# Patient Record
Sex: MALE | Race: WHITE | NOT HISPANIC OR LATINO | Employment: PART TIME | URBAN - METROPOLITAN AREA
[De-identification: names, ages, dates, MRNs, and addresses within clinical notes are randomized per-mention and may not be internally consistent; named-entity substitution may affect disease eponyms.]

---

## 2020-02-27 ENCOUNTER — OFFICE VISIT (OUTPATIENT)
Dept: FAMILY MEDICINE CLINIC | Facility: CLINIC | Age: 55
End: 2020-02-27

## 2020-02-27 VITALS
RESPIRATION RATE: 16 BRPM | OXYGEN SATURATION: 99 % | BODY MASS INDEX: 26.07 KG/M2 | WEIGHT: 172 LBS | HEART RATE: 67 BPM | DIASTOLIC BLOOD PRESSURE: 90 MMHG | HEIGHT: 68 IN | TEMPERATURE: 97.6 F | SYSTOLIC BLOOD PRESSURE: 160 MMHG

## 2020-02-27 DIAGNOSIS — F41.0 PANIC DISORDER: ICD-10-CM

## 2020-02-27 DIAGNOSIS — Z78.9 NEED FOR FOLLOW-UP BY SOCIAL WORKER: Primary | ICD-10-CM

## 2020-02-27 DIAGNOSIS — R03.0 ELEVATED BLOOD PRESSURE READING: ICD-10-CM

## 2020-02-27 PROCEDURE — 3008F BODY MASS INDEX DOCD: CPT | Performed by: PHYSICIAN ASSISTANT

## 2020-02-27 PROCEDURE — 1036F TOBACCO NON-USER: CPT | Performed by: NURSE PRACTITIONER

## 2020-02-27 PROCEDURE — 99204 OFFICE O/P NEW MOD 45 MIN: CPT | Performed by: NURSE PRACTITIONER

## 2020-02-27 PROCEDURE — 3008F BODY MASS INDEX DOCD: CPT | Performed by: NURSE PRACTITIONER

## 2020-02-27 RX ORDER — CLONAZEPAM 0.5 MG/1
0.5 TABLET ORAL 2 TIMES DAILY
Qty: 60 TABLET | Refills: 0 | Status: SHIPPED | OUTPATIENT
Start: 2020-02-27 | End: 2020-03-28 | Stop reason: SDUPTHER

## 2020-02-27 NOTE — PROGRESS NOTES
Assessment/Plan:    Elevated blood pressure reading   Blood pressure elevated in the office today: the patient denies a history of hypertension but does admit that he is very anxious about being here today   discussed with the patient that he will return to clinic on Monday for blood pressure recheck with nurse if blood pressure is elevated again at that time he agrees to start antihypertensive medication   encouraged the patient to decrease dietary sodium intake, avoid tobacco, avoid alcohol or illicit drugs, avoid excessive caffeine intake, avoid NSAIDs, participate in exercise    Need for follow-up by    The patient requires assistance with becoming established with a mental health provider      Panic disorder   Will continue patient's previously ordered medication clonazepam b i d    recommended that the patient start fluoxetine daily to control symptoms-- the patient declines and would rather wait to see a psychiatrist before changing medication   denies suicidal ideation or self injury   encourage the patient to participate in routine exercise especially swimming as he states that this is something he has utilized previously to help control anxiety and panic   referral to behavioral health specialist         Return for RTC on Friday for BP recheck and in 1 month provider   Patient Instructions   Anxiety   WHAT YOU NEED TO KNOW:   Anxiety is a condition that causes you to feel extremely worried or nervous  The feelings are so strong that they can cause problems with your daily activities or sleep  Anxiety may be triggered by something you fear, or it may happen without a cause  Family or work stress, smoking, caffeine, and alcohol can increase your risk for anxiety  Certain medicines or health conditions can also increase your risk  Anxiety can become a long-term condition if it is not managed or treated     DISCHARGE INSTRUCTIONS:   Call 911 if:   · You have chest pain, tightness, or heaviness that may spread to your shoulders, arms, jaw, neck, or back  · You feel like hurting yourself or someone else  Contact your healthcare provider if:   · Your symptoms get worse or do not get better with treatment  · Your anxiety keeps you from doing your regular daily activities  · You have new symptoms since your last visit  · You have questions or concerns about your condition or care  Medicines:   · Medicines  may be given to help you feel more calm and relaxed, and decrease your symptoms  · Take your medicine as directed  Contact your healthcare provider if you think your medicine is not helping or if you have side effects  Tell him of her if you are allergic to any medicine  Keep a list of the medicines, vitamins, and herbs you take  Include the amounts, and when and why you take them  Bring the list or the pill bottles to follow-up visits  Carry your medicine list with you in case of an emergency  Follow up with your healthcare provider within 2 weeks or as directed:  Write down your questions so you remember to ask them during your visits  Manage anxiety:   · Talk to someone about your anxiety  Your healthcare provider may suggest counseling  Cognitive behavioral therapy can help you understand and change how you react to events that trigger your symptoms  You might feel more comfortable talking with a friend or family member about your anxiety  Choose someone you know will be supportive and encouraging  · Find ways to relax  Activities such as exercise, meditation, or listening to music can help you relax  Spend time with friends, or do things you enjoy  · Practice deep breathing  Deep breathing can help you relax when you feel anxious  Focus on taking slow, deep breaths several times a day, or during an anxiety attack  Breathe in through your nose and out through your mouth  · Create a regular sleep routine  Regular sleep can help you feel calmer during the day   Go to sleep and wake up at the same times every day  Do not watch television or use the computer right before bed  Your room should be comfortable, dark, and quiet  · Eat a variety of healthy foods  Healthy foods include fruits, vegetables, low-fat dairy products, lean meats, fish, whole-grain breads, and cooked beans  Healthy foods can help you feel less anxious and have more energy  · Exercise regularly  Exercise can increase your energy level  Exercise may also lift your mood and help you sleep better  Your healthcare provider can help you create an exercise plan  · Do not smoke  Nicotine and other chemicals in cigarettes and cigars can increase anxiety  Ask your healthcare provider for information if you currently smoke and need help to quit  E-cigarettes or smokeless tobacco still contain nicotine  Talk to your healthcare provider before you use these products  · Do not have caffeine  Caffeine can make your symptoms worse  Do not have foods or drinks that are meant to increase your energy level  · Limit or do not drink alcohol  Ask your healthcare provider if alcohol is safe for you  You may not be able to drink alcohol if you take certain anxiety or depression medicines  Limit alcohol to 1 drink per day if you are a woman  Limit alcohol to 2 drinks per day if you are a man  A drink of alcohol is 12 ounces of beer, 5 ounces of wine, or 1½ ounces of liquor  · Do not use drugs  Drugs can make your anxiety worse  It can also make anxiety hard to manage  Talk to your healthcare provider if you use drugs and want help to quit  © 2017 [address] [employer] Information is for End User's use only and may not be sold, redistributed or otherwise used for commercial purposes  All illustrations and images included in CareNotes® are the copyrighted property of [employer]  or Tian Spicer  The above information is an  only   It is not intended as medical advice for individual conditions or treatments  Talk to your doctor, nurse or pharmacist before following any medical regimen to see if it is safe and effective for you  Diagnoses and all orders for this visit:    Need for follow-up by   -     Ambulatory referral to social work care management program    Panic disorder  -     CBC and differential; Future  -     Comprehensive metabolic panel; Future  -     Hemoglobin A1C; Future  -     Lipid panel; Future  -     TSH, 3rd generation with Free T4 reflex; Future  -     Vitamin D 25 hydroxy; Future  -     Ambulatory referral to behavioral health therapists; Future  -     clonazePAM (KlonoPIN) 0 5 mg tablet; Take 1 tablet (0 5 mg total) by mouth 2 (two) times a day    Elevated blood pressure reading          Subjective:     Bereket Murrell is a 53 y o  male who  has a past medical history of Anxiety  who presented to the office today to establish care  HPI  Patient presents today to establish care  He has lived in the area for about 9 months but has not established with any providers  He was previously living in [facility]  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 [employer]  He was previously working at [employer] but had to quit his job secondary to panic attack  He was previously established with a psychiatrist and therapist in [place] where he was receiving counseling and the was taking clonazepam 0 5 mg b i d  He reports that he has tried to not take the medication and develops panic attacks and has ideas of paranoia  He was previously taking Seroquel which made him tired all the time and so he stopped  He has never been on any other medication  Besides Seroquel and clonazepam   The patient reports that he utilizes swimming as a formal exercise and methods to help control his anxiety  Since moving to the [facility] he has not been able to participate in this activity  Due to finances   Discussed with the patient that the [facility] has a large living full and often has financial aid for those who cannot afford the membership  He denies any suicide ideation or self injury  His blood pressure is noted to be elevated in the office today  He does endorse that he is very anxious about being here today  He does not recall ever having been diagnosed with hypertension before  He has never taken blood pressure medication  He denies shortness of breath, chest pain, headaches, palpitations, dizziness, visual changes, syncope  He reports that his family history is significant for hypertension and diabetes in his mother and father  He denies any use of tobacco, alcohol, illicit drugs  The following portions of the patient's history were reviewed and updated as appropriate: allergies, current medications, past family history, past medical history, past social history, past surgical history and problem list     No current outpatient medications on file prior to visit  No current facility-administered medications on file prior to visit  Review of Systems   Constitutional: Negative for activity change, appetite change, chills, fatigue, fever and unexpected weight change  HENT: Negative for hearing loss, nosebleeds, sinus pain, sneezing, sore throat and trouble swallowing  Eyes: Negative for photophobia and visual disturbance  Respiratory: Negative for cough, chest tightness, shortness of breath and wheezing  Cardiovascular: Negative for chest pain, palpitations and leg swelling  Gastrointestinal: Negative for abdominal pain, constipation, nausea and vomiting  Genitourinary: Negative for decreased urine volume, difficulty urinating, dysuria, flank pain and urgency  Musculoskeletal: Negative for back pain and gait problem  Skin: Negative for pallor, rash and wound  Neurological: Negative for dizziness, seizures, syncope, weakness, numbness and headaches     Hematological: Negative for adenopathy  Does not bruise/bleed easily  Psychiatric/Behavioral: Positive for dysphoric mood  Negative for confusion, hallucinations, self-injury, sleep disturbance and suicidal ideas  The patient is nervous/anxious  Objective:    /90 (BP Location: Left arm, Patient Position: Sitting, Cuff Size: Standard)   Pulse 67   Temp 97 6 °F (36 4 °C) (Temporal)   Resp 16   Ht 5' 8" (1 727 m)   Wt 78 kg (172 lb)   SpO2 99%   BMI 26 15 kg/m²     Physical Exam   Constitutional: He is oriented to person, place, and time  He appears well-developed and well-nourished  No distress  HENT:   Head: Normocephalic and atraumatic  Right Ear: External ear normal    Left Ear: External ear normal    Nose: Nose normal    Mouth/Throat: Oropharynx is clear and moist  No oropharyngeal exudate  Eyes: Pupils are equal, round, and reactive to light  EOM are normal    Neck: Normal range of motion  Neck supple  Cardiovascular: Normal rate, regular rhythm, normal heart sounds and intact distal pulses  Pulmonary/Chest: Effort normal and breath sounds normal  No respiratory distress  He has no wheezes  Abdominal: Soft  Bowel sounds are normal  He exhibits no distension  There is no tenderness  Musculoskeletal: Normal range of motion  He exhibits no edema or deformity  Lymphadenopathy:     He has no cervical adenopathy  Neurological: He is alert and oriented to person, place, and time  He displays normal reflexes  No sensory deficit  Coordination normal    Skin: Skin is warm and dry  Capillary refill takes less than 2 seconds  No rash noted  He is not diaphoretic  Psychiatric: His behavior is normal  His mood appears anxious  Nursing note and vitals reviewed        ALYSSA Downs  02/27/20  12:54 PM

## 2020-02-27 NOTE — ASSESSMENT & PLAN NOTE
Will continue patient's previously ordered medication clonazepam b i d    recommended that the patient start fluoxetine daily to control symptoms-- the patient declines and would rather wait to see a psychiatrist before changing medication   denies suicidal ideation or self injury   encourage the patient to participate in routine exercise especially swimming as he states that this is something he has utilized previously to help control anxiety and panic   referral to behavioral health specialist

## 2020-02-27 NOTE — ASSESSMENT & PLAN NOTE
Blood pressure elevated in the office today: the patient denies a history of hypertension but does admit that he is very anxious about being here today   discussed with the patient that he will return to clinic on Monday for blood pressure recheck with nurse if blood pressure is elevated again at that time he agrees to start antihypertensive medication   encouraged the patient to decrease dietary sodium intake, avoid tobacco, avoid alcohol or illicit drugs, avoid excessive caffeine intake, avoid NSAIDs, participate in exercise

## 2020-02-27 NOTE — PATIENT INSTRUCTIONS
Anxiety   WHAT YOU NEED TO KNOW:   Anxiety is a condition that causes you to feel extremely worried or nervous  The feelings are so strong that they can cause problems with your daily activities or sleep  Anxiety may be triggered by something you fear, or it may happen without a cause  Family or work stress, smoking, caffeine, and alcohol can increase your risk for anxiety  Certain medicines or health conditions can also increase your risk  Anxiety can become a long-term condition if it is not managed or treated  DISCHARGE INSTRUCTIONS:   Call 911 if:   · You have chest pain, tightness, or heaviness that may spread to your shoulders, arms, jaw, neck, or back  · You feel like hurting yourself or someone else  Contact your healthcare provider if:   · Your symptoms get worse or do not get better with treatment  · Your anxiety keeps you from doing your regular daily activities  · You have new symptoms since your last visit  · You have questions or concerns about your condition or care  Medicines:   · Medicines  may be given to help you feel more calm and relaxed, and decrease your symptoms  · Take your medicine as directed  Contact your healthcare provider if you think your medicine is not helping or if you have side effects  Tell him of her if you are allergic to any medicine  Keep a list of the medicines, vitamins, and herbs you take  Include the amounts, and when and why you take them  Bring the list or the pill bottles to follow-up visits  Carry your medicine list with you in case of an emergency  Follow up with your healthcare provider within 2 weeks or as directed:  Write down your questions so you remember to ask them during your visits  Manage anxiety:   · Talk to someone about your anxiety  Your healthcare provider may suggest counseling  Cognitive behavioral therapy can help you understand and change how you react to events that trigger your symptoms   You might feel more comfortable talking with a friend or family member about your anxiety  Choose someone you know will be supportive and encouraging  · Find ways to relax  Activities such as exercise, meditation, or listening to music can help you relax  Spend time with friends, or do things you enjoy  · Practice deep breathing  Deep breathing can help you relax when you feel anxious  Focus on taking slow, deep breaths several times a day, or during an anxiety attack  Breathe in through your nose and out through your mouth  · Create a regular sleep routine  Regular sleep can help you feel calmer during the day  Go to sleep and wake up at the same times every day  Do not watch television or use the computer right before bed  Your room should be comfortable, dark, and quiet  · Eat a variety of healthy foods  Healthy foods include fruits, vegetables, low-fat dairy products, lean meats, fish, whole-grain breads, and cooked beans  Healthy foods can help you feel less anxious and have more energy  · Exercise regularly  Exercise can increase your energy level  Exercise may also lift your mood and help you sleep better  Your healthcare provider can help you create an exercise plan  · Do not smoke  Nicotine and other chemicals in cigarettes and cigars can increase anxiety  Ask your healthcare provider for information if you currently smoke and need help to quit  E-cigarettes or smokeless tobacco still contain nicotine  Talk to your healthcare provider before you use these products  · Do not have caffeine  Caffeine can make your symptoms worse  Do not have foods or drinks that are meant to increase your energy level  · Limit or do not drink alcohol  Ask your healthcare provider if alcohol is safe for you  You may not be able to drink alcohol if you take certain anxiety or depression medicines  Limit alcohol to 1 drink per day if you are a woman  Limit alcohol to 2 drinks per day if you are a man   A drink of alcohol is 12 ounces of beer, 5 ounces of wine, or 1½ ounces of liquor  · Do not use drugs  Drugs can make your anxiety worse  It can also make anxiety hard to manage  Talk to your healthcare provider if you use drugs and want help to quit  © 2017 [employer] Information is for End User's use only and may not be sold, redistributed or otherwise used for commercial purposes  All illustrations and images included in CareNotes® are the copyrighted property of [employer]  or Tian Spicer  The above information is an  only  It is not intended as medical advice for individual conditions or treatments  Talk to your doctor, nurse or pharmacist before following any medical regimen to see if it is safe and effective for you

## 2020-03-02 ENCOUNTER — CLINICAL SUPPORT (OUTPATIENT)
Dept: FAMILY MEDICINE CLINIC | Facility: CLINIC | Age: 55
End: 2020-03-02

## 2020-03-02 ENCOUNTER — DOCUMENTATION (OUTPATIENT)
Dept: FAMILY MEDICINE CLINIC | Facility: CLINIC | Age: 55
End: 2020-03-02

## 2020-03-02 VITALS — DIASTOLIC BLOOD PRESSURE: 86 MMHG | SYSTOLIC BLOOD PRESSURE: 140 MMHG

## 2020-03-02 DIAGNOSIS — Z01.30 BP CHECK: Primary | ICD-10-CM

## 2020-03-02 NOTE — PROGRESS NOTES
I received a referral for this patient  Mr Sherrie Lopez is also in need of a psychiatrist  I am closing this referral on my end since social work also received the same referral  I defer to social work to locate a psychiatrist for Mr Huang  I remain available for consultation

## 2020-03-04 ENCOUNTER — PATIENT OUTREACH (OUTPATIENT)
Dept: FAMILY MEDICINE CLINIC | Facility: CLINIC | Age: 55
End: 2020-03-04

## 2020-03-04 NOTE — PROGRESS NOTES
ESAU SUTHERLAND referral from [facility] to assist pt with resources for a [place] [address] provider  Pt is currently listed as uninsured  ESAU SUTHERLAND spoke with pt who states that he applied for MA with [employer] and has been approved  Pt states he will choose [facility] or [place] as his provider  Pt is requesting ESAU SUTHERLAND to mail a list of local [place] [address] providers to him  ESAU SUTHERLAND will do this today  ESAU SUTHERLAND educated pt to contact [place] [address] provider when he receives his insurance card and he will have a wait time of about 1-2 weeks for a MH intake  Pt expressed understanding  Pt denies any other psychosocial needs at this time  ESAU SUTHERLAND is closing referral at this time, but remains available for additional support as needed via order

## 2020-03-28 ENCOUNTER — TELEMEDICINE (OUTPATIENT)
Dept: FAMILY MEDICINE CLINIC | Facility: CLINIC | Age: 55
End: 2020-03-28

## 2020-03-28 DIAGNOSIS — F41.0 PANIC DISORDER: ICD-10-CM

## 2020-03-28 PROCEDURE — T1015 CLINIC SERVICE: HCPCS | Performed by: NURSE PRACTITIONER

## 2020-03-28 PROCEDURE — 99213 OFFICE O/P EST LOW 20 MIN: CPT | Performed by: NURSE PRACTITIONER

## 2020-03-28 RX ORDER — CLONAZEPAM 0.5 MG/1
0.5 TABLET ORAL 2 TIMES DAILY
Qty: 60 TABLET | Refills: 0 | Status: SHIPPED | OUTPATIENT
Start: 2020-03-28 | End: 2020-05-08 | Stop reason: SDUPTHER

## 2020-03-28 NOTE — PROGRESS NOTES
Virtual Brief Visit    Problem List Items Addressed This Visit        Other    Panic disorder    Relevant Medications    clonazePAM (KlonoPIN) 0 5 mg tablet                Reason for visit is follow up  Encounter provider Jacqui Chen, [facility]    Provider located at [facility] [address] [address] [phone]      Recent Visits  No visits were found meeting these conditions  Showing recent visits within past 7 days and meeting all other requirements     Today's Visits  Date Type Provider Dept   03/28/20 [address] [place] [address] today's visits and meeting all other requirements     Future Appointments  No visits were found meeting these conditions  Showing future appointments within next 150 days and meeting all other requirements        After connecting through telephone, the patient was identified by name and date of birth  Jesus Collins was informed that this is a telemedicine visit and that the visit is being conducted through telephone  My office door was closed  No one else was in the room  He acknowledged consent and understanding of privacy and security of the video platform  The patient has agreed to participate and understands they can discontinue the visit at any time  Patient is aware this is a billable service  Subjective  Jesus Collins is a 53 y o  male with a past medical history of anxiety disorder  The patient was seen in the office one month ago to establish care  The patient was noted to have panic disorder and was ordered clonazepam bid and was directed to follow up with a mental health provider  He reports today that he has not yet established with a mental health provider  He reports that he is doing well, his mood and anxiety has been stable with the use of the clonazepam  He has not completed the previously ordered blood work as of yet due to coronavirus concerns         Past Medical History:   Diagnosis Date    Anxiety        No past surgical history on file  Current Outpatient Medications   Medication Sig Dispense Refill    clonazePAM (KlonoPIN) 0 5 mg tablet Take 1 tablet (0 5 mg total) by mouth 2 (two) times a day 60 tablet 0     No current facility-administered medications for this visit  No Known Allergies    Review of Systems   Constitutional: Negative for activity change, appetite change, chills, fatigue, fever and unexpected weight change  HENT: Negative for hearing loss, nosebleeds, sinus pain, sneezing, sore throat and trouble swallowing  Eyes: Negative for photophobia and visual disturbance  Respiratory: Negative for cough, chest tightness, shortness of breath and wheezing  Cardiovascular: Negative for chest pain, palpitations and leg swelling  Gastrointestinal: Negative for abdominal pain, constipation, nausea and vomiting  Genitourinary: Negative for decreased urine volume, difficulty urinating, dysuria, flank pain, genital sores, hematuria and urgency  Musculoskeletal: Negative for back pain and gait problem  Skin: Negative for pallor, rash and wound  Neurological: Negative for dizziness, seizures, syncope, weakness, numbness and headaches  Hematological: Negative for adenopathy  Does not bruise/bleed easily  Psychiatric/Behavioral: Negative for confusion, hallucinations, self-injury, sleep disturbance and suicidal ideas  The patient is not nervous/anxious  James Bowers was seen today for virtual brief visit  Diagnoses and all orders for this visit:    Panic disorder  -     clonazePAM (KlonoPIN) 0 5 mg tablet; Take 1 tablet (0 5 mg total) by mouth 2 (two) times a day    Discussed with the patient that we will continue the clonazepam at this time  Medication refill was sent to the preferred pharmacy  Encouraged the patient to complete labs and establish with mental health provider as soon as able   Discussed with the patient if he has any other questions or concerns to call the office  He understands and agrees with the plan  I spent 15 minutes with the patient during this visit

## 2020-05-08 DIAGNOSIS — F41.0 PANIC DISORDER: ICD-10-CM

## 2020-05-08 DIAGNOSIS — F41.0 PANIC DISORDER: Primary | ICD-10-CM

## 2020-05-08 RX ORDER — CLONAZEPAM 0.5 MG/1
0.5 TABLET ORAL 2 TIMES DAILY
Qty: 60 TABLET | Refills: 0 | Status: SHIPPED | OUTPATIENT
Start: 2020-05-08 | End: 2020-06-15 | Stop reason: SDUPTHER

## 2020-05-18 ENCOUNTER — TELEPHONE (OUTPATIENT)
Dept: PSYCHIATRY | Facility: CLINIC | Age: 55
End: 2020-05-18

## 2020-06-15 ENCOUNTER — TELEMEDICINE (OUTPATIENT)
Dept: FAMILY MEDICINE CLINIC | Facility: CLINIC | Age: 55
End: 2020-06-15

## 2020-06-15 DIAGNOSIS — Z20.828 EXPOSURE TO SARS-ASSOCIATED CORONAVIRUS: Primary | ICD-10-CM

## 2020-06-15 DIAGNOSIS — F41.0 PANIC DISORDER: ICD-10-CM

## 2020-06-15 PROCEDURE — 99213 OFFICE O/P EST LOW 20 MIN: CPT | Performed by: PHYSICIAN ASSISTANT

## 2020-06-15 PROCEDURE — T1015 CLINIC SERVICE: HCPCS | Performed by: PHYSICIAN ASSISTANT

## 2020-06-15 RX ORDER — CLONAZEPAM 0.5 MG/1
0.5 TABLET ORAL 2 TIMES DAILY
Qty: 60 TABLET | Refills: 0 | Status: SHIPPED | OUTPATIENT
Start: 2020-06-15 | End: 2020-08-12 | Stop reason: SDUPTHER

## 2020-07-23 ENCOUNTER — TELEPHONE (OUTPATIENT)
Dept: PSYCHIATRY | Facility: CLINIC | Age: 55
End: 2020-07-23

## 2020-07-23 NOTE — TELEPHONE ENCOUNTER
Dakota Reese called and would like to set up an intake evaluation he has a referral on file can you give him a call please thank you

## 2020-07-24 ENCOUNTER — TELEPHONE (OUTPATIENT)
Dept: PSYCHIATRY | Facility: CLINIC | Age: 55
End: 2020-07-24

## 2020-07-24 NOTE — TELEPHONE ENCOUNTER
Behavorial Health Outpatient Intake Questions    Referred by: PCP    Please advised interviewee that they need to answer all questions truthfully to allow for best care and any misrepresentations of information may affect their ability to be seen at this clinic   => Was this discussed? Yes     [facility] -     Presenting Problem (in patient's words): Trouble with anxiety, went through a bad divorce and having a tough time  Are there any developmental disabilities? ? If yes, can they speak to you on the phone? If they are too limited to speak to you on phone, refer out No    Are you taking any psychiatric medications? Yes    => If yes, who prescribes? If yes, are they injectable medications? Clonazepam - PCP    Does the patient have a language barrier or hearing impairment? No    Have you been treated at [facility] by a therapist or a doctor in the past? If yes, who? No    Has the patient been hospitalized for mental health? No   If yes, how long ago was last hospitalization and where was it? Do you actively use alcohol or marijuana or illegal substances? If yes, what and how much - refer out to Drug and alcohol treatment if use is excessive or daily use of illegal substances No concerns of substance abuse are reported  Do you have a community treatment team or ? No    Legal History-     Does the patient have any history of arrests, retirement/[facility] time, or DUIs? No  If Yes-  1) What types of charges? 2) When were they last incarcerated? 3) Are they currently on parole or probation? Minor Child-    Who has custody of the child? Is there a custody agreement? If there is a custody agreement remind parent that they must bring a copy to the first appt or they will not be seen       Intake Team, please check with provider before scheduling if flags come up such as:  - complex case  - legal history (other than DUI)  - communication barrier concerns are present  - if, in your judgment, this needs further review    ACCEPTED as a patient Yes  => Appointment Date: 08/20/2020 w/ Ankush    Referred Elsewhere? No    Name of Insurance Co: Jennifer Cook 8 ID# [id number]  [id number] Phone #  If ins is primary or secondary  If patient is a minor, parents information such as Name, D  O B of guarantor

## 2020-08-12 DIAGNOSIS — F41.0 PANIC DISORDER: ICD-10-CM

## 2020-08-12 DIAGNOSIS — F41.0 PANIC DISORDER: Primary | ICD-10-CM

## 2020-08-12 RX ORDER — CLONAZEPAM 0.5 MG/1
0.5 TABLET ORAL 2 TIMES DAILY
Qty: 30 TABLET | Refills: 0 | Status: SHIPPED | OUTPATIENT
Start: 2020-08-12 | End: 2020-09-15

## 2020-08-20 ENCOUNTER — SOCIAL WORK (OUTPATIENT)
Dept: BEHAVIORAL/MENTAL HEALTH CLINIC | Facility: CLINIC | Age: 55
End: 2020-08-20
Payer: COMMERCIAL

## 2020-08-20 DIAGNOSIS — F41.0 PANIC DISORDER: ICD-10-CM

## 2020-08-20 PROCEDURE — 90791 PSYCH DIAGNOSTIC EVALUATION: CPT | Performed by: SOCIAL WORKER
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
## 2020-08-20 NOTE — BH TREATMENT PLAN
Brandy Her  1965       Date of Initial Treatment Plan: 8/20/20   Date of Current Treatment Plan: 08/20/20    Treatment Plan Number 1     Strengths/Personal Resources for Self Care: artistic, creative, caring    Diagnosis:   1  Panic disorder  Ambulatory referral to Psychiatry       Area of Needs: friendship      Long Term Goal 1: AReduce symptoms of anxiety     Target Date: 2/20/21  Completion Date: N/A         Short Term Objectives for Goal 1: AI will be able to identify at least 5 ways in which my life history ahs impacted and continues to impact symptoms of anxiety and depression  and BI will be able to learn at least 5 coping sklls to reduce symptoms of anxiety and improve my level of life satisfaction  Long Term Goal 2: Establish therapeutic rapport  Target Date: 2/20/21  Completion Date: N/A    Short Term Objectives for Goal 2: AI [facility]at least 1 time per month) and BI will be able to utilize at least 3 skills that I have learned during therapy in my daily life  GOAL 1: Modality: Individual 2x per month   Completion Date ongoing and The person(s) responsible for carrying out the plan is  Darrin Chavarria 2: Modality: Individual 2x per month   Completion Date ongoing and The person(s) responsible for carrying out the plan is  Joseph [address]: Diagnosis and Treatment Plan explained to Xavier Arriaza relates understanding diagnosis and is agreeable to Treatment Plan  Client Comments : Please share your thoughts, feelings, need and/or experiences regarding your treatment plan: Treatment plan completed during initial session  Due to social distancing guidelines Radha Chopra was unable to sign consent, but is in agreement that the therapist sign as proxy

## 2020-09-08 ENCOUNTER — SOCIAL WORK (OUTPATIENT)
Dept: BEHAVIORAL/MENTAL HEALTH CLINIC | Facility: CLINIC | Age: 55
End: 2020-09-08
Payer: COMMERCIAL

## 2020-09-08 DIAGNOSIS — F41.0 PANIC DISORDER: Primary | ICD-10-CM

## 2020-09-08 PROCEDURE — 90834 PSYTX W PT 45 MINUTES: CPT | Performed by: SOCIAL WORKER

## 2020-09-08 NOTE — PSYCH
Psychotherapy Provided: Individual Psychotherapy 50 minutes     Length of time in session: 2:05 pm to 2:55 pm, follow up in 1 month    Goals addressed in session: Goal 1 and Goal 2     Pain:      moderate    5    Current suicide risk : Low     Therapist met with Michelle Bui  He shared that he remained conflicted about what to do with his life  He remains bothered by the noise in his neighborhood and struggles with mood regulation due to this  He feels very anxious when he hears loud noises and becomes engaged in arguments with his neighbors  Therapist and Michelle Bui discussed his desire to relocate to a more secluded residence and 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

## 2020-09-14 ENCOUNTER — OFFICE VISIT (OUTPATIENT)
Dept: FAMILY MEDICINE CLINIC | Facility: CLINIC | Age: 55
End: 2020-09-14

## 2020-09-14 VITALS
OXYGEN SATURATION: 98 % | SYSTOLIC BLOOD PRESSURE: 140 MMHG | HEART RATE: 86 BPM | TEMPERATURE: 97.1 F | RESPIRATION RATE: 15 BRPM | BODY MASS INDEX: 26.15 KG/M2 | WEIGHT: 172 LBS | DIASTOLIC BLOOD PRESSURE: 90 MMHG

## 2020-09-14 DIAGNOSIS — Z72.51 HIGH RISK SEXUAL BEHAVIOR, UNSPECIFIED TYPE: Primary | ICD-10-CM

## 2020-09-14 DIAGNOSIS — N50.812 TESTICULAR PAIN, LEFT: ICD-10-CM

## 2020-09-14 DIAGNOSIS — Z11.4 ENCOUNTER FOR SCREENING FOR HIV: ICD-10-CM

## 2020-09-14 DIAGNOSIS — F41.0 PANIC DISORDER: ICD-10-CM

## 2020-09-14 DIAGNOSIS — I10 HYPERTENSION, UNSPECIFIED TYPE: ICD-10-CM

## 2020-09-14 LAB
SL AMB  POCT GLUCOSE, UA: NORMAL
SL AMB LEUKOCYTE ESTERASE,UA: NORMAL
SL AMB POCT BILIRUBIN,UA: NORMAL
SL AMB POCT BLOOD,UA: NORMAL
SL AMB POCT CLARITY,UA: CLEAR
SL AMB POCT COLOR,UA: CLEAR
SL AMB POCT KETONES,UA: NORMAL
SL AMB POCT NITRITE,UA: NORMAL
SL AMB POCT PH,UA: 7
SL AMB POCT SPECIFIC GRAVITY,UA: 1
SL AMB POCT URINE PROTEIN: NORMAL
SL AMB POCT UROBILINOGEN: NORMAL

## 2020-09-14 PROCEDURE — 1036F TOBACCO NON-USER: CPT | Performed by: NURSE PRACTITIONER

## 2020-09-14 PROCEDURE — 87591 N.GONORRHOEAE DNA AMP PROB: CPT | Performed by: NURSE PRACTITIONER

## 2020-09-14 PROCEDURE — 87491 CHLMYD TRACH DNA AMP PROBE: CPT | Performed by: NURSE PRACTITIONER

## 2020-09-14 PROCEDURE — 99214 OFFICE O/P EST MOD 30 MIN: CPT | Performed by: NURSE PRACTITIONER

## 2020-09-14 PROCEDURE — 81002 URINALYSIS NONAUTO W/O SCOPE: CPT | Performed by: NURSE PRACTITIONER

## 2020-09-14 RX ORDER — LISINOPRIL 5 MG/1
5 TABLET ORAL DAILY
Qty: 30 TABLET | Refills: 0 | Status: SHIPPED | OUTPATIENT
Start: 2020-09-14 | End: 2021-03-26 | Stop reason: SDUPTHER

## 2020-09-14 RX ORDER — AZITHROMYCIN 500 MG/1
1000 TABLET, FILM COATED ORAL ONCE
Qty: 2 TABLET | Refills: 0 | Status: SHIPPED | OUTPATIENT
Start: 2020-09-14 | End: 2020-09-14

## 2020-09-14 NOTE — ASSESSMENT & PLAN NOTE
Left testicular pain x 2 weeks   No major abnormality noted on exam  UA normal  D/t report of unprotected intercourse will treat with 1 g azithromycin  Gc/chlamydia sent  Will also check for HIV, RPR, hepatits   Will send for testicular u/s

## 2020-09-14 NOTE — PROGRESS NOTES
Assessment/Plan:    Hypertension  As bp's have consistently been above goal will start on low dose lisinopril 5 mg daily   Advised the patient to decrease sodium, avoid excessive caffeine, avoid illicit drugs   Adopt heart healthy diet     Testicular pain, left  Left testicular pain x 2 weeks   No major abnormality noted on exam  UA normal  D/t report of unprotected intercourse will treat with 1 g azithromycin  Gc/chlamydia sent  Will also check for HIV, RPR, hepatits   Will send for testicular u/s     Panic disorder  Currently following with a therapist   Will continue with current tx        Return in about 3 months (around 12/14/2020) for htn   Patient Instructions   Testicle Pain   WHAT YOU NEED TO KNOW:   Testicle pain may start in your scrotum and spread to your abdomen  You may have sharp, sudden pain or dull pain that happens over time  Your testicle pain may come and go, or it may last for a long time  The cause of your pain may be unknown  Testicle pain can be caused by infection, trauma, hernia, kidney stones, or sexually transmitted infections (STIs)  You may have a painful lump in your scrotum  The lump may be caused by an enlarged vein or fluid that collects around one of your testicles  This lump also may be caused by a more serious medical condition  Part of your testicle may twist  This is a serious condition that needs treatment as soon as possible  DISCHARGE INSTRUCTIONS:   Medicines:   · Antibiotics: This medicine helps fight or prevent infection  Take your antibiotics until they are gone, even if you feel better  · Pain medicine: You may be given a prescription medicine to decrease pain  Do not wait until the pain is severe before you take this medicine  · NSAIDs:  These medicines decrease swelling, pain, and fever  NSAIDs are available without a doctor's order  Ask your healthcare provider which medicine is right for you  Ask how much to take and when to take it  Take as directed  NSAIDs can cause stomach bleeding and kidney problems if not taken correctly  · Take your medicine as directed  Contact your healthcare provider if you think your medicine is not helping or if you have side effects  Tell him or her if you are allergic to any medicine  Keep a list of the medicines, vitamins, and herbs you take  Include the amounts, and when and why you take them  Bring the list or the pill bottles to follow-up visits  Carry your medicine list with you in case of an emergency  Decrease discomfort:  With treatment, your pain may improve within 1 to 3 days  Depending on the cause of your testicle pain, your condition may take up to 4 weeks to heal   · Rest:  Limit your activity until your pain decreases  Get more rest while you heal  Do not sit for long periods of time  · Cold packs:  Place cold packs on your testicles to help ease your pain  Use cold packs as directed  · Elevation:  Gently tuck a folded towel under your testicles to lift them as you sit in a chair or lie in bed  This will help ease your pain and decrease swelling  Follow up with your healthcare provider or urologist in 3 to 7 days:  Write down your questions so you remember to ask them during your visits  Sexual activity:  Avoid sexual activity until you have finished your antibiotics or until your healthcare provider tells you it is safe to have sex  Use condoms to lower your risk of STIs  Contact your healthcare provider or urologist if:   · You feel that your medicine or treatment is not working  · You feel more pain, tenderness, or swelling than before  · You have nausea or a low fever  · You have questions or concerns about your condition or care  Return to the emergency department if:   · You have sudden or severe pain in your testicles or abdomen  · You have pain in both testicles  · You are vomiting  · You have a high fever  · Your pain increases when you elevate your testicles      · Your scrotum turns blue  This could mean your testicle is not getting the blood flow it needs  © 2017 [address]  Information is for End User's use only and may not be sold, redistributed or otherwise used for commercial purposes  All illustrations and images included in CareNotes® are the copyrighted property of A D A M , Inc  or Tian Spicer  The above information is an  only  It is not intended as medical advice for individual conditions or treatments  Talk to your doctor, nurse or pharmacist before following any medical regimen to see if it is safe and effective for you  Diagnoses and all orders for this visit:    High risk sexual behavior, unspecified type  -     azithromycin (Zithromax) 500 MG tablet; Take 2 tablets (1,000 mg total) by mouth once for 1 dose  -     RPR; Future  -     Hepatitis C antibody; Future    Hypertension, unspecified type  -     lisinopril (ZESTRIL) 5 mg tablet; Take 1 tablet (5 mg total) by mouth daily    Testicular pain, left  -     POCT urine dip  -     Cancel: Chlamydia/GC amplified DNA by PCR; Future  -     US scrotum and testicles; Future  -     Chlamydia/GC amplified DNA by PCR    Encounter for screening for HIV  -     HIV 1/2 Antigen/Antibody (4th Generation) w Reflex [facility]; Future    Panic disorder          Subjective:     Ronny Alvareztingham is a 63 y o  male who  has a past medical history of Anxiety  who presented to the office today for follow up  He reports that overall he is doing well  He now follows with a therapist routinely but does not have a psychiatrist  He does report that he is concerned annabella to left testicle pain x 2 weeks  He does not recall any trauma or injury prior to onset, does report hx of vasectomy 10 years ago  The pain is worse with laying down  There is no pain on the right side  There is no fever, penile discharge, urinary sx, or lesions   He does admit that he participated in unprotected intercourse w/in the past few months  The following portions of the patient's history were reviewed and updated as appropriate: allergies, current medications, past family history, past medical history, past social history, past surgical history and problem list     Current Outpatient Medications on File Prior to Visit   Medication Sig Dispense Refill    clonazePAM (KlonoPIN) 0 5 mg tablet Take 1 tablet (0 5 mg total) by mouth 2 (two) times a day 30 tablet 0     No current facility-administered medications on file prior to visit  Review of Systems   Constitutional: Negative for activity change, appetite change, chills, fatigue, fever and unexpected weight change  HENT: Negative for hearing loss, nosebleeds, sinus pain, sneezing, sore throat and trouble swallowing  Eyes: Negative for photophobia and visual disturbance  Respiratory: Negative for cough, chest tightness, shortness of breath and wheezing  Cardiovascular: Negative for chest pain, palpitations and leg swelling  Gastrointestinal: Negative for abdominal pain, constipation, nausea and vomiting  Genitourinary: Positive for testicular pain  Negative for decreased urine volume, difficulty urinating, discharge, dysuria, flank pain, frequency, genital sores, hematuria, penile pain, penile swelling, scrotal swelling and urgency  Musculoskeletal: Negative for back pain and gait problem  Skin: Negative for pallor, rash and wound  Neurological: Negative for dizziness, seizures, syncope, weakness, numbness and headaches  Hematological: Negative for adenopathy  Does not bruise/bleed easily  Psychiatric/Behavioral: Negative for confusion, hallucinations, self-injury, sleep disturbance and suicidal ideas  The patient is not nervous/anxious                Objective:    /90 (BP Location: Left arm, Patient Position: Sitting, Cuff Size: Adult)   Pulse 86   Temp (!) 97 1 °F (36 2 °C) (Temporal)   Resp 15   Wt 78 kg (172 lb)   SpO2 98%   BMI 26 15 kg/m²     Physical Exam  Vitals signs and nursing note reviewed  Constitutional:       General: He is not in acute distress  Appearance: He is well-developed  He is not diaphoretic  HENT:      Head: Normocephalic and atraumatic  Right Ear: External ear normal       Left Ear: External ear normal    Eyes:      General:         Right eye: No discharge  Left eye: No discharge  Conjunctiva/sclera: Conjunctivae normal    Neck:      Musculoskeletal: Normal range of motion and neck supple  Cardiovascular:      Rate and Rhythm: Normal rate and regular rhythm  Pulses: Normal pulses  Heart sounds: Normal heart sounds  Pulmonary:      Effort: Pulmonary effort is normal  No respiratory distress  Breath sounds: Normal breath sounds  No wheezing  Abdominal:      General: Bowel sounds are normal  There is no distension  Palpations: Abdomen is soft  Tenderness: There is no abdominal tenderness  There is no right CVA tenderness or left CVA tenderness  Hernia: There is no hernia in the left inguinal area or right inguinal area  Genitourinary:     Penis: Normal        Scrotum/Testes:         Right: Mass, tenderness or swelling not present  Left: Mass, tenderness or swelling not present  Epididymis:      Right: Not inflamed  Left: Inflamed  Musculoskeletal: Normal range of motion  General: No deformity  Lymphadenopathy:      Cervical: No cervical adenopathy  Skin:     General: Skin is warm and dry  Capillary Refill: Capillary refill takes less than 2 seconds  Findings: No rash  Neurological:      General: No focal deficit present  Mental Status: He is alert and oriented to person, place, and time     Psychiatric:         Mood and Affect: Mood normal          Behavior: Behavior normal          ALYSSA Gordon  09/14/20  5:10 PM

## 2020-09-14 NOTE — ASSESSMENT & PLAN NOTE
As bp's have consistently been above goal will start on low dose lisinopril 5 mg daily   Advised the patient to decrease sodium, avoid excessive caffeine, avoid illicit drugs   Adopt heart healthy diet

## 2020-09-14 NOTE — PATIENT INSTRUCTIONS
Testicle Pain   WHAT YOU NEED TO KNOW:   Testicle pain may start in your scrotum and spread to your abdomen  You may have sharp, sudden pain or dull pain that happens over time  Your testicle pain may come and go, or it may last for a long time  The cause of your pain may be unknown  Testicle pain can be caused by infection, trauma, hernia, kidney stones, or sexually transmitted infections (STIs)  You may have a painful lump in your scrotum  The lump may be caused by an enlarged vein or fluid that collects around one of your testicles  This lump also may be caused by a more serious medical condition  Part of your testicle may twist  This is a serious condition that needs treatment as soon as possible  DISCHARGE INSTRUCTIONS:   Medicines:   · Antibiotics: This medicine helps fight or prevent infection  Take your antibiotics until they are gone, even if you feel better  · Pain medicine: You may be given a prescription medicine to decrease pain  Do not wait until the pain is severe before you take this medicine  · NSAIDs:  These medicines decrease swelling, pain, and fever  NSAIDs are available without a doctor's order  Ask your healthcare provider which medicine is right for you  Ask how much to take and when to take it  Take as directed  NSAIDs can cause stomach bleeding and kidney problems if not taken correctly  · Take your medicine as directed  Contact your healthcare provider if you think your medicine is not helping or if you have side effects  Tell him or her if you are allergic to any medicine  Keep a list of the medicines, vitamins, and herbs you take  Include the amounts, and when and why you take them  Bring the list or the pill bottles to follow-up visits  Carry your medicine list with you in case of an emergency  Decrease discomfort:  With treatment, your pain may improve within 1 to 3 days   Depending on the cause of your testicle pain, your condition may take up to 4 weeks to heal   · Rest: Limit your activity until your pain decreases  Get more rest while you heal  Do not sit for long periods of time  · Cold packs:  Place cold packs on your testicles to help ease your pain  Use cold packs as directed  · Elevation:  Gently tuck a folded towel under your testicles to lift them as you sit in a chair or lie in bed  This will help ease your pain and decrease swelling  Follow up with your healthcare provider or urologist in 3 to 7 days:  Write down your questions so you remember to ask them during your visits  Sexual activity:  Avoid sexual activity until you have finished your antibiotics or until your healthcare provider tells you it is safe to have sex  Use condoms to lower your risk of STIs  Contact your healthcare provider or urologist if:   · You feel that your medicine or treatment is not working  · You feel more pain, tenderness, or swelling than before  · You have nausea or a low fever  · You have questions or concerns about your condition or care  Return to the emergency department if:   · You have sudden or severe pain in your testicles or abdomen  · You have pain in both testicles  · You are vomiting  · You have a high fever  · Your pain increases when you elevate your testicles  · Your scrotum turns blue  This could mean your testicle is not getting the blood flow it needs  © 2017 [address] Information is for End User's use only and may not be sold, redistributed or otherwise used for commercial purposes  All illustrations and images included in CareNotes® are the copyrighted property of A D A M , Inc  or Tian Spicer  The above information is an  only  It is not intended as medical advice for individual conditions or treatments  Talk to your doctor, nurse or pharmacist before following any medical regimen to see if it is safe and effective for you

## 2020-09-15 DIAGNOSIS — F41.0 PANIC DISORDER: ICD-10-CM

## 2020-09-15 RX ORDER — CLONAZEPAM 0.5 MG/1
0.5 TABLET ORAL 2 TIMES DAILY
Qty: 30 TABLET | Refills: 0 | Status: SHIPPED | OUTPATIENT
Start: 2020-09-15 | End: 2020-10-13 | Stop reason: SDUPTHER

## 2020-09-15 RX ORDER — CLONAZEPAM 0.5 MG/1
TABLET ORAL
Qty: 30 TABLET | Refills: 0 | Status: SHIPPED | OUTPATIENT
Start: 2020-09-15 | End: 2020-09-15 | Stop reason: SDUPTHER

## 2020-09-16 LAB
C TRACH DNA SPEC QL NAA+PROBE: NEGATIVE
N GONORRHOEA DNA SPEC QL NAA+PROBE: NEGATIVE

## 2020-09-21 ENCOUNTER — HOSPITAL ENCOUNTER (OUTPATIENT)
Dept: ULTRASOUND IMAGING | Facility: HOSPITAL | Age: 55
Discharge: HOME | End: 2020-09-21
Payer: COMMERCIAL

## 2020-09-21 ENCOUNTER — APPOINTMENT (OUTPATIENT)
Dept: LAB | Facility: HOSPITAL | Age: 55
End: 2020-09-21
Payer: COMMERCIAL

## 2020-09-21 DIAGNOSIS — Z11.4 ENCOUNTER FOR SCREENING FOR HIV: ICD-10-CM

## 2020-09-21 DIAGNOSIS — F41.0 PANIC DISORDER: ICD-10-CM

## 2020-09-21 DIAGNOSIS — Z72.51 HIGH RISK SEXUAL BEHAVIOR, UNSPECIFIED TYPE: ICD-10-CM

## 2020-09-21 DIAGNOSIS — N50.812 TESTICULAR PAIN, LEFT: ICD-10-CM

## 2020-09-21 LAB
25(OH)D3 SERPL-MCNC: 30.8 NG/ML (ref 30–100)
ALBUMIN SERPL BCP-MCNC: 4.3 G/DL (ref 3.5–5)
ALP SERPL-CCNC: 55 U/L (ref 46–116)
ALT SERPL W P-5'-P-CCNC: 47 U/L (ref 12–78)
ANION GAP SERPL CALCULATED.3IONS-SCNC: 6 MMOL/L (ref 4–13)
AST SERPL W P-5'-P-CCNC: 26 U/L (ref 5–45)
BASOPHILS # BLD AUTO: 0.05 THOUSANDS/ΜL (ref 0–0.1)
BASOPHILS NFR BLD AUTO: 1 % (ref 0–1)
BILIRUB SERPL-MCNC: 0.4 MG/DL (ref 0.2–1)
BUN SERPL-MCNC: 16 MG/DL (ref 5–25)
CALCIUM SERPL-MCNC: 9.5 MG/DL (ref 8.3–10.1)
CHLORIDE SERPL-SCNC: 102 MMOL/L (ref 100–108)
CHOLEST SERPL-MCNC: 283 MG/DL (ref 50–200)
CO2 SERPL-SCNC: 30 MMOL/L (ref 21–32)
CREAT SERPL-MCNC: 1.34 MG/DL (ref 0.6–1.3)
EOSINOPHIL # BLD AUTO: 0.1 THOUSAND/ΜL (ref 0–0.61)
EOSINOPHIL NFR BLD AUTO: 2 % (ref 0–6)
ERYTHROCYTE [DISTWIDTH] IN BLOOD BY AUTOMATED COUNT: 12.1 % (ref 11.6–15.1)
EST. AVERAGE GLUCOSE BLD GHB EST-MCNC: 111 MG/DL
GFR SERPL CREATININE-BSD FRML MDRD: 59 ML/MIN/1.73SQ M
GLUCOSE P FAST SERPL-MCNC: 102 MG/DL (ref 65–99)
HBA1C MFR BLD: 5.5 %
HCT VFR BLD AUTO: 49.2 % (ref 36.5–49.3)
HCV AB SER QL: NORMAL
HDLC SERPL-MCNC: 49 MG/DL
HGB BLD-MCNC: 16.6 G/DL (ref 12–17)
IMM GRANULOCYTES # BLD AUTO: 0.01 THOUSAND/UL (ref 0–0.2)
IMM GRANULOCYTES NFR BLD AUTO: 0 % (ref 0–2)
LDLC SERPL CALC-MCNC: 207 MG/DL (ref 0–100)
LYMPHOCYTES # BLD AUTO: 1.99 THOUSANDS/ΜL (ref 0.6–4.47)
LYMPHOCYTES NFR BLD AUTO: 32 % (ref 14–44)
MCH RBC QN AUTO: 31 PG (ref 26.8–34.3)
MCHC RBC AUTO-ENTMCNC: 33.7 G/DL (ref 31.4–37.4)
MCV RBC AUTO: 92 FL (ref 82–98)
MONOCYTES # BLD AUTO: 0.56 THOUSAND/ΜL (ref 0.17–1.22)
MONOCYTES NFR BLD AUTO: 9 % (ref 4–12)
NEUTROPHILS # BLD AUTO: 3.6 THOUSANDS/ΜL (ref 1.85–7.62)
NEUTS SEG NFR BLD AUTO: 56 % (ref 43–75)
NONHDLC SERPL-MCNC: 234 MG/DL
NRBC BLD AUTO-RTO: 0 /100 WBCS
PLATELET # BLD AUTO: 286 THOUSANDS/UL (ref 149–390)
PMV BLD AUTO: 10.1 FL (ref 8.9–12.7)
POTASSIUM SERPL-SCNC: 4.4 MMOL/L (ref 3.5–5.3)
PROT SERPL-MCNC: 7.8 G/DL (ref 6.4–8.2)
RBC # BLD AUTO: 5.35 MILLION/UL (ref 3.88–5.62)
RPR SER QL: NORMAL
SODIUM SERPL-SCNC: 138 MMOL/L (ref 136–145)
TRIGL SERPL-MCNC: 134 MG/DL
TSH SERPL DL<=0.05 MIU/L-ACNC: 3.38 UIU/ML (ref 0.36–3.74)
WBC # BLD AUTO: 6.31 THOUSAND/UL (ref 4.31–10.16)

## 2020-09-21 PROCEDURE — 82306 VITAMIN D 25 HYDROXY: CPT

## 2020-09-21 PROCEDURE — 80053 COMPREHEN METABOLIC PANEL: CPT

## 2020-09-21 PROCEDURE — 76870 US EXAM SCROTUM: CPT

## 2020-09-21 PROCEDURE — 36415 COLL VENOUS BLD VENIPUNCTURE: CPT

## 2020-09-21 PROCEDURE — 86803 HEPATITIS C AB TEST: CPT

## 2020-09-21 PROCEDURE — 80061 LIPID PANEL: CPT

## 2020-09-21 PROCEDURE — 86592 SYPHILIS TEST NON-TREP QUAL: CPT

## 2020-09-21 PROCEDURE — 84443 ASSAY THYROID STIM HORMONE: CPT

## 2020-09-21 PROCEDURE — 85025 COMPLETE CBC W/AUTO DIFF WBC: CPT

## 2020-09-21 PROCEDURE — 87389 HIV-1 AG W/HIV-1&-2 AB AG IA: CPT

## 2020-09-21 PROCEDURE — 83036 HEMOGLOBIN GLYCOSYLATED A1C: CPT

## 2020-09-23 DIAGNOSIS — E78.2 MIXED HYPERLIPIDEMIA: Primary | ICD-10-CM

## 2020-09-23 DIAGNOSIS — R79.89 ELEVATED SERUM CREATININE: ICD-10-CM

## 2020-09-23 LAB — HIV 1+2 AB+HIV1 P24 AG SERPL QL IA: NORMAL

## 2020-09-23 RX ORDER — ATORVASTATIN CALCIUM 20 MG/1
20 TABLET, FILM COATED ORAL DAILY
Qty: 30 TABLET | Refills: 0 | Status: SHIPPED | OUTPATIENT
Start: 2020-09-23

## 2020-10-13 DIAGNOSIS — F41.0 PANIC DISORDER: ICD-10-CM

## 2020-10-13 RX ORDER — CLONAZEPAM 0.5 MG/1
0.5 TABLET ORAL 2 TIMES DAILY
Qty: 30 TABLET | Refills: 0 | Status: SHIPPED | OUTPATIENT
Start: 2020-10-13 | End: 2020-11-19 | Stop reason: SDUPTHER

## 2020-11-16 ENCOUNTER — TELEPHONE (OUTPATIENT)
Dept: FAMILY MEDICINE CLINIC | Facility: CLINIC | Age: 55
End: 2020-11-16

## 2020-11-19 DIAGNOSIS — F41.0 PANIC DISORDER: ICD-10-CM

## 2020-11-19 RX ORDER — CLONAZEPAM 0.5 MG/1
0.5 TABLET ORAL 2 TIMES DAILY
Qty: 30 TABLET | Refills: 0 | Status: SHIPPED | OUTPATIENT
Start: 2020-11-19 | End: 2020-12-21 | Stop reason: SDUPTHER

## 2020-11-23 ENCOUNTER — LAB (OUTPATIENT)
Dept: LAB | Facility: HOSPITAL | Age: 55
End: 2020-11-23
Payer: COMMERCIAL

## 2020-11-23 DIAGNOSIS — R79.89 ELEVATED SERUM CREATININE: ICD-10-CM

## 2020-11-23 LAB
ANION GAP SERPL CALCULATED.3IONS-SCNC: 7 MMOL/L (ref 5–14)
BUN SERPL-MCNC: 12 MG/DL (ref 5–25)
CALCIUM SERPL-MCNC: 9.5 MG/DL (ref 8.4–10.2)
CHLORIDE SERPL-SCNC: 103 MMOL/L (ref 97–108)
CO2 SERPL-SCNC: 28 MMOL/L (ref 22–30)
CREAT SERPL-MCNC: 1.19 MG/DL (ref 0.7–1.5)
GFR SERPL CREATININE-BSD FRML MDRD: 68 ML/MIN/1.73SQ M
GLUCOSE P FAST SERPL-MCNC: 98 MG/DL (ref 70–99)
POTASSIUM SERPL-SCNC: 4.5 MMOL/L (ref 3.6–5)
SODIUM SERPL-SCNC: 138 MMOL/L (ref 137–147)

## 2020-11-23 PROCEDURE — 80048 BASIC METABOLIC PNL TOTAL CA: CPT

## 2020-11-23 PROCEDURE — 36415 COLL VENOUS BLD VENIPUNCTURE: CPT

## 2020-12-21 DIAGNOSIS — F41.0 PANIC DISORDER: ICD-10-CM

## 2020-12-21 RX ORDER — CLONAZEPAM 0.5 MG/1
0.5 TABLET ORAL 2 TIMES DAILY
Qty: 30 TABLET | Refills: 0 | Status: SHIPPED | OUTPATIENT
Start: 2020-12-21 | End: 2021-01-15 | Stop reason: SDUPTHER

## 2021-01-14 ENCOUNTER — TELEPHONE (OUTPATIENT)
Dept: FAMILY MEDICINE CLINIC | Facility: CLINIC | Age: 56
End: 2021-01-14

## 2021-01-14 NOTE — TELEPHONE ENCOUNTER
Pt called stating he is having that issue again in his scrotum area  Asking for a refill of the medication rx last time   Please advise thank you

## 2021-01-15 DIAGNOSIS — N50.812 TESTICULAR PAIN, LEFT: Primary | ICD-10-CM

## 2021-01-15 DIAGNOSIS — F41.0 PANIC DISORDER: ICD-10-CM

## 2021-01-15 RX ORDER — CLONAZEPAM 0.5 MG/1
0.5 TABLET ORAL 2 TIMES DAILY
Qty: 30 TABLET | Refills: 0 | Status: SHIPPED | OUTPATIENT
Start: 2021-01-15 | End: 2021-02-08 | Stop reason: SDUPTHER
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
## 2021-02-08 ENCOUNTER — OFFICE VISIT (OUTPATIENT)
Dept: PSYCHIATRY | Facility: CLINIC | Age: 56
End: 2021-02-08
Payer: COMMERCIAL

## 2021-02-08 VITALS — HEIGHT: 68 IN | WEIGHT: 174.6 LBS | BODY MASS INDEX: 26.46 KG/M2

## 2021-02-08 DIAGNOSIS — F63.3 TRICHOTILLOMANIA: Chronic | ICD-10-CM

## 2021-02-08 DIAGNOSIS — F41.0 PANIC DISORDER: Primary | ICD-10-CM

## 2021-02-08 PROCEDURE — 99213 OFFICE O/P EST LOW 20 MIN: CPT | Performed by: STUDENT IN AN ORGANIZED HEALTH CARE EDUCATION/TRAINING PROGRAM

## 2021-02-08 RX ORDER — HYDROXYZINE HYDROCHLORIDE 10 MG/1
10 TABLET, FILM COATED ORAL EVERY 6 HOURS PRN
Qty: 90 TABLET | Refills: 0 | Status: SHIPPED | OUTPATIENT
Start: 2021-02-08 | End: 2021-04-08 | Stop reason: DRUGHIGH

## 2021-02-08 RX ORDER — CLONAZEPAM 0.5 MG/1
0.25 TABLET ORAL 2 TIMES DAILY PRN
Qty: 30 TABLET | Refills: 2 | Status: SHIPPED | OUTPATIENT
Start: 2021-02-08 | End: 2021-04-08 | Stop reason: SDUPTHER

## 2021-02-08 NOTE — BH TREATMENT PLAN
TREATMENT PLAN (Medication Management Only)        [facility]    Name and Date of Birth:  Lynnea Sacks 63 y o  1965  Date of Treatment Plan: February 8, 2021  Diagnosis/Diagnoses:    1  Panic disorder    2  Trichotillomania      Strengths/Personal Resources for Self-Care: "Spirituality, art-work and his dog"  Area/Areas of need (in own words): "panic attacks", anxiety symptoms  1  Long Term Goal: continue improvement in anxiety  Target Date:4 months - 6/8/2021  Person/Persons responsible for completion of goal: Joseph Loja  Short Term Objective (s) - How will we reach this goal?:   A  Provider new recommended medication/dosage changes and/or continue medication(s): continue current medications as prescribed  B  individual therapy  C  N/A  Target Date:4 months - 6/8/2021  Person/Persons Responsible for Completion of Goal: Monica Bring  Progress Towards Goals: starting treatment  Treatment Modality: medication management every 4 months  Review due 180 days from date of this plan: 4 months - 6/8/2021  Expected length of service: maintenance  My Physician/PA/NP and I have developed this plan together and I agree to work on the goals and objectives  I understand the treatment goals that were developed for my treatment

## 2021-03-01 NOTE — TELEPHONE ENCOUNTER
Will provide with 2 weeks supply as patient will be establishing with psychiatry 8/20/2020 who should take over prescribing his medications  Calm/Appropriate underweight Rubin scale- 20 (low risk for further skin breakdown)  Skin: Wound: Right malleolus and right third toe cellulitis
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
## 2021-03-08 ENCOUNTER — OFFICE VISIT (OUTPATIENT)
Dept: PSYCHIATRY | Facility: CLINIC | Age: 56
End: 2021-03-08
Payer: COMMERCIAL

## 2021-03-08 VITALS — HEIGHT: 68 IN | BODY MASS INDEX: 26.55 KG/M2

## 2021-03-08 DIAGNOSIS — F63.3 TRICHOTILLOMANIA: Chronic | ICD-10-CM

## 2021-03-08 DIAGNOSIS — F41.0 PANIC DISORDER: Primary | ICD-10-CM

## 2021-03-08 PROCEDURE — 99213 OFFICE O/P EST LOW 20 MIN: CPT | Performed by: STUDENT IN AN ORGANIZED HEALTH CARE EDUCATION/TRAINING PROGRAM

## 2021-03-08 RX ORDER — ESCITALOPRAM OXALATE 10 MG/1
10 TABLET ORAL DAILY
Qty: 30 TABLET | Refills: 0 | Status: SHIPPED | OUTPATIENT
Start: 2021-03-08 | End: 2021-04-08 | Stop reason: SDUPTHER

## 2021-03-26 ENCOUNTER — OFFICE VISIT (OUTPATIENT)
Dept: FAMILY MEDICINE CLINIC | Facility: CLINIC | Age: 56
End: 2021-03-26

## 2021-03-26 ENCOUNTER — HOSPITAL ENCOUNTER (OUTPATIENT)
Dept: RADIOLOGY | Facility: HOSPITAL | Age: 56
Discharge: HOME | End: 2021-03-26
Payer: COMMERCIAL

## 2021-03-26 VITALS
DIASTOLIC BLOOD PRESSURE: 90 MMHG | SYSTOLIC BLOOD PRESSURE: 146 MMHG | OXYGEN SATURATION: 98 % | HEIGHT: 68 IN | HEART RATE: 85 BPM | RESPIRATION RATE: 16 BRPM | TEMPERATURE: 98 F | BODY MASS INDEX: 25.31 KG/M2 | WEIGHT: 167 LBS

## 2021-03-26 DIAGNOSIS — S97.112A CRUSHING INJURY OF LEFT GREAT TOE, INITIAL ENCOUNTER: Primary | ICD-10-CM

## 2021-03-26 DIAGNOSIS — I10 HYPERTENSION, UNSPECIFIED TYPE: ICD-10-CM

## 2021-03-26 DIAGNOSIS — S97.112A CRUSHING INJURY OF LEFT GREAT TOE, INITIAL ENCOUNTER: ICD-10-CM

## 2021-03-26 PROBLEM — S97.119A: Status: ACTIVE | Noted: 2021-03-26

## 2021-03-26 PROCEDURE — 73630 X-RAY EXAM OF FOOT: CPT

## 2021-03-26 PROCEDURE — 99213 OFFICE O/P EST LOW 20 MIN: CPT | Performed by: FAMILY MEDICINE

## 2021-03-26 RX ORDER — LISINOPRIL 5 MG/1
5 TABLET ORAL DAILY
Qty: 90 TABLET | Refills: 1 | Status: SHIPPED | OUTPATIENT
Start: 2021-03-26 | End: 2021-05-17 | Stop reason: SDUPTHER

## 2021-03-26 NOTE — ASSESSMENT & PLAN NOTE
Neurovascularly intact & currently weight bearing w/ moderate pain  R/o fx with XR at this time  Cont w/ ice, elevation  Toe nail is lifting from nail bed- will most likely fall off within  1-2 weeks  Cont w/ Tylenol & use IBU sparingly ( due to Hx of HTN)  Have discussed w/ pt if he experiences significant pain, swelling return to office

## 2021-03-26 NOTE — PROGRESS NOTES
Assessment/Plan:    Crushing injury of great toe  Neurovascularly intact & currently weight bearing w/ moderate pain  R/o fx with XR at this time  Cont w/ ice, elevation  Toe nail is lifting from nail bed- will most likely fall off within  1-2 weeks  Cont w/ Tylenol & use IBU sparingly ( due to Hx of HTN)  Have discussed w/ pt if he experiences significant pain, swelling return to office  Diagnoses and all orders for this visit:    Crushing injury of left great toe, initial encounter  -     Cancel: XR foot 3+ vw left; Future  -     XR foot 3+ vw left; Future    Hypertension, unspecified type  -     lisinopril (ZESTRIL) 5 mg tablet; Take 1 tablet (5 mg total) by mouth daily          Subjective:      Patient ID: Vj Ovalles is a 63 y o  male  Vj Ovalles is a 63 y o  male, presents to the clinic today due to concerns for his left big toe  He states that approximately 2-3 days ago he was lifting a heavy okay table and it slipped and dropped on his toe  Initially he did not have any pain but but did notice discoloration of black and blue and   A day later he did have significant pain to the point where he felt like he was about to pass out  He noticed that the nail of his 1st digit was turning white and was concerned about infection and subsequently made an appointment to be evaluated  He initially had difficulty walking but today was able to walk into the clinic without too much pain  The following portions of the patient's history were reviewed and updated as appropriate: He  has a past medical history of Anxiety  Patient Active Problem List    Diagnosis Date Noted    Crushing injury of great toe 03/26/2021    Trichotillomania 02/08/2021    Testicular pain, left 09/14/2020    Need for follow-up by  02/27/2020    Panic disorder 02/27/2020    Hypertension 02/27/2020     He  has no past surgical history on file    His family history includes Cancer in his mother; Hypertension in his father and mother  He  reports that he has never smoked  He has never used smokeless tobacco  He reports that he does not drink alcohol or use drugs  Current Outpatient Medications   Medication Sig Dispense Refill    clonazePAM (KlonoPIN) 0 5 mg tablet Take 0 5 tablets (0 25 mg total) by mouth 2 (two) times a day as needed for anxiety 30 tablet 2    atorvastatin (LIPITOR) 20 mg tablet Take 1 tablet (20 mg total) by mouth daily (Patient not taking: Reported on 2/8/2021) 30 tablet 0    escitalopram (LEXAPRO) 10 mg tablet Take 1 tablet (10 mg total) by mouth daily (Patient not taking: Reported on 3/26/2021) 30 tablet 0    hydrOXYzine HCL (ATARAX) 10 mg tablet Take 1 tablet (10 mg total) by mouth every 6 (six) hours as needed for anxiety 90 tablet 0    lisinopril (ZESTRIL) 5 mg tablet Take 1 tablet (5 mg total) by mouth daily 90 tablet 1     No current facility-administered medications for this visit  Current Outpatient Medications on File Prior to Visit   Medication Sig    clonazePAM (KlonoPIN) 0 5 mg tablet Take 0 5 tablets (0 25 mg total) by mouth 2 (two) times a day as needed for anxiety    atorvastatin (LIPITOR) 20 mg tablet Take 1 tablet (20 mg total) by mouth daily (Patient not taking: Reported on 2/8/2021)    escitalopram (LEXAPRO) 10 mg tablet Take 1 tablet (10 mg total) by mouth daily (Patient not taking: Reported on 3/26/2021)    hydrOXYzine HCL (ATARAX) 10 mg tablet Take 1 tablet (10 mg total) by mouth every 6 (six) hours as needed for anxiety    [DISCONTINUED] lisinopril (ZESTRIL) 5 mg tablet Take 1 tablet (5 mg total) by mouth daily (Patient not taking: Reported on 2/8/2021)     No current facility-administered medications on file prior to visit  He has No Known Allergies       Review of Systems   Constitutional: Negative for appetite change, diaphoresis and fever  Respiratory: Negative for cough, shortness of breath and wheezing      Cardiovascular: Negative for chest pain and palpitations  Gastrointestinal: Negative for abdominal pain, constipation and diarrhea  Musculoskeletal:        Great toe pain & bruising  Toe nail turning white     Neurological: Negative for dizziness and headaches  Objective:      /90 (BP Location: Left arm, Patient Position: Sitting, Cuff Size: Standard)   Pulse 85   Temp 98 °F (36 7 °C) (Temporal)   Resp 16   Ht 5' 8" (1 727 m)   Wt 75 8 kg (167 lb)   SpO2 98%   BMI 25 39 kg/m²          Physical Exam  Constitutional:       General: He is not in acute distress  Appearance: Normal appearance  He is well-developed  He is not ill-appearing or diaphoretic  Cardiovascular:      Pulses:           Dorsalis pedis pulses are 2+ on the left side  Posterior tibial pulses are 2+ on the left side  Musculoskeletal:         General: No tenderness  Left foot: Normal range of motion  Feet:    Feet:      Left foot:      Protective Sensation: 5 sites tested  5 sites sensed  Skin integrity: Skin integrity normal    Skin:     General: Skin is warm and dry  Neurological:      Mental Status: He is alert

## 2021-04-07 NOTE — PSYCH
MEDICATION MANAGEMENT NOTE        [facility]      Name and Date of Birth:  Redd Underwood 63 y o  1965 MRN: [id number]    Date of Visit: April 8, 2021    Reason for Visit:   Chief Complaint   Patient presents with    Medication Management    Anxiety    OCD       SUBJECTIVE:  The patient arrived  on time to his appointment for medication management and follow up visit for OCD, anxiety, panic attacks and trichotillomania  Presented calm, and cooperative  He got his first dose of COVID-19 vaccine today  He is going to [place] for business trip, and then would go to [place] to visit his parents and then his son who is in college there  Endorsed good mood  He noted that he was not sleeping good for a couple of nights, and took Klonopin PRN, otherwise endorsed good sleep, appetite, concentration, energy level  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, and was happy about it  Denied feelings of anhedonia, hopelessness, helplessness, worthlessness or guilt and appeared to be future oriented  There was no thought constriction related to death  Denied SI/HI, intent or plan upon direct inquiry at this time  Denied AV/H  No specific phobia or panic attacks reported  Reported less intense OCD sxs as decreased number of checking and feels less affected by the sxs  No manic sxs, paranoid ideations or fixed delusions were elicited  Endorsed good compliance with the medications and denied any side effects  Drinks IPA occasionally, and 4 cups of coffee per day  Denied smoking cigarettes, binge drinking alcohol or other illicit substance use  Given this presentation, medications are maintained at the same dosage  Upcoming therapy appointment w/ Lottie Boas on 5/18/2021   The patient was educated to call 911 or go to the nearest emergency room if the symptoms become overwhelming or unable to remain in control  Verbalized understanding and agreed to seek help in case of distress or concern for safety  Review Of Systems:  Pertinent items are noted in HPI; all others are negative; no recent changes in medications or health status reported  PHQ-9 Depression Screening    PHQ-9:   Frequency of the following problems over the past two weeks:      Little interest or pleasure in doing things: 0 - not at all  Feeling down, depressed, or hopeless: 0 - not at all  Trouble falling or staying asleep, or sleeping too much: 1 - several days  Feeling tired or having little energy: 1 - several days  Poor appetite or overeatin - not at all  Feeling bad about yourself - or that you are a failure or have let yourself or your family down: 0 - not at all  Trouble concentrating on things, such as reading the newspaper or watching television: 1 - several days  Moving or speaking so slowly that other people could have noticed  Or the opposite - being so fidgety or restless that you have been moving around a lot more than usual: 0 - not at all  Thoughts that you would be better off dead, or of hurting yourself in some way: 0 - not at all  PHQ-2 Score: 0  PHQ-9 Score: 3         BRENNON-7 Flowsheet Screening      Most Recent Value   Over the last two weeks, how often have you been bothered by the following problems? Feeling nervous, anxious, or on edge  0   Not being able to stop or control worrying  1   Worrying too much about different things  1   Trouble relaxing   2   Being so restless that it's hard to sit still  1   Becoming easily annoyed or irritable   1   Feeling afraid as if something awful might happen  1   How difficult have these problems made it for you to do your work, take care of things at home, or get along with other people?    Not difficult at all   BRENNON Score   7            Past Psychiatric History Update:   - No inpatient psychiatric admission since last encounter  - No SA or SIB since last encounter  - No incidence of violent behavior since last encounter    Past Trauma History Update:    - No new onset of abuse or traumatic events since last encounter     Past Medical History:    Past Medical History:   Diagnosis Date    Anxiety      No past medical history pertinent negatives  History reviewed  No pertinent surgical history    No Known Allergies    Substance Abuse History:    Social History     Substance and Sexual Activity   Alcohol Use Never    Frequency: Never     Social History     Substance and Sexual Activity   Drug Use Never       Social History:    Social History     Socioeconomic History    Marital status:      Spouse name: Not on file    Number of children: Not on file    Years of education: Not on file    Highest education level: Not on file   Occupational History    Not on file   Social Needs    Financial resource strain: Not on file    Food insecurity     Worry: Not on file     Inability: Not on file    Transportation needs     Medical: Not on file     Non-medical: Not on file   Tobacco Use    Smoking status: Never Smoker    Smokeless tobacco: Never Used   Substance and Sexual Activity    Alcohol use: Never     Frequency: Never    Drug use: Never    Sexual activity: Not on file   Lifestyle    Physical activity     Days per week: Not on file     Minutes per session: Not on file    Stress: Not on file   Relationships    Social connections     Talks on phone: Not on file     Gets together: Not on file     Attends Anglican service: Not on file     Active member of club or organization: Not on file     Attends meetings of clubs or organizations: Not on file     Relationship status: Not on file    Intimate partner violence     Fear of current or ex partner: Not on file     Emotionally abused: Not on file     Physically abused: Not on file     Forced sexual activity: Not on file   Other Topics Concern    Not on file   Social History Narrative    Not on file Family Psychiatric History:     Family History   Problem Relation Age of Onset    Hypertension Mother     Cancer Mother     Hypertension Father        History Review: The following portions of the patient's history were reviewed and updated as appropriate: allergies, current medications, past family history, past medical history, past social history, past surgical history and problem list        OBJECTIVE:     Vital signs in last 24 hours:    Vitals:    04/08/21 1259   Height: 5' 8" (1 727 m)       Mental Status Evaluation:  Appearance and attitude: appeared as stated age, cooperative and attentive, casually dressed with good hygiene, wearing a facemask  Eye contact: good  Motor Function: within normal limits, intact gait, No PMA/PMR  Gait/station: normal gait/station and normal balance  Speech: normal for rate, rhythm, volume, latency, amount  Language: Able to name objects and Able to repeat phrases  Mood/affect: euthymic / Affect was euthymic, reactive, in full range, normal intensity and mood congruent  Thought Processes: sequential and goal-directed  Thought content: denies suicidal ideation or homicidal ideation; no delusions or first rank symptoms  Associations: intact associations  Perceptual disturbances: denies Auditory/Visual/Tactile Hallucinations  Orientation: oriented to time, person, place and to the situational context  Cognitive Function: intact  Memory: intact short-term and long-term  Intellect: average  Fund of knowledge: aware of current events, aware of past history and vocabulary average  Impulse control: good  Insight/judgment: good/good    Pain: denied  Pain scale: 0    Laboratory Results: I have personally reviewed all pertinent laboratory/tests results    Recent Labs (last 2 months):   No visits with results within 2 Month(s) from this visit     Latest known visit with results is:   Lab on 11/23/2020   Component Date Value    Sodium 11/23/2020 138     Potassium 11/23/2020 4 5  Chloride 11/23/2020 103     CO2 11/23/2020 28     ANION GAP 11/23/2020 7     BUN 11/23/2020 12     Creatinine 11/23/2020 1 19     Glucose, Fasting 11/23/2020 98     Calcium 11/23/2020 9 5     eGFR 11/23/2020 68          Assessment/Plan:   A 63 y o   male,  (has 24 y/o son), domiciled alone, employed at [employer] (past-time), and also as an artist, w/ PMH of HTN, HLD and PPH of Panic disorder, no prior psychiatric admissions, no prior SA, no h/o self-injurious behavior, on Klonopin 0 5 mg po BID, recently in therapy w/ Ms  Jassi Aggarwal (last in 9/2020) who presented to the mental health clinic for the initial intake and psychiatric evaluation on 2/8/2021  Presented w/ h/o anxiety, panic attacks at times, which has been more controlled lately w/ Klonopin PRN, and h/o trichotillomania since childhood  Denied SI/HI, intent or plan upon direct inquiry at this time  PHQ-9: 3; BRENNON-7: 5  His current presentation meets criteria for Panic Disorder, by history and Trichotillomania  Klonopin tapered down to 0 25 mg BID and Atarax 10 mg po TID PRN for anxiety provided, and later started on Lexapro 10 mg po daily  Recommended to continue individual therapy           Diagnoses and all orders for this visit:    Panic disorder  -     escitalopram (LEXAPRO) 10 mg tablet; Take 1 tablet (10 mg total) by mouth daily  -     clonazePAM (KlonoPIN) 0 5 mg tablet; Take 0 5 tablets (0 25 mg total) by mouth 2 (two) times a day as needed for anxiety  -     hydrOXYzine HCL (ATARAX) 25 mg tablet; Take 1 tablet (25 mg total) by mouth every 6 (six) hours as needed for anxiety    Trichotillomania  -     escitalopram (LEXAPRO) 10 mg tablet; Take 1 tablet (10 mg total) by mouth daily          Impression:  1  Panic disorder  escitalopram (LEXAPRO) 10 mg tablet    clonazePAM (KlonoPIN) 0 5 mg tablet    hydrOXYzine HCL (ATARAX) 25 mg tablet   2   Trichotillomania  escitalopram (LEXAPRO) 10 mg tablet       Treatment Recommendations/Precautions:  - Continue Lexapro 10 mg po daily for anxiety and mood  - Atarax 25 mg po PRN for anxiety / panic attacks  - Clonipin 0 5 mg po nightly PRN for anxiety; to be tapered off as tolerated  - Medications sent to patient's pharmacy for 30 day supply x2 refills   - Psychoeducation provided to the patient and benefits, potential risks and side effects discussed; importance of compliance with the psychiatric treatment reiterated, and the patient verbalized understanding of the matter   - Risks and side effects of chronic benzodiazepine use discussed with patient, including risk for falls, sedation, respiratory depression (especially if taken in higher dose(s) than prescribed or if taken together with another sedating medication or alcohol or other sedating substance), as well as risk of addiction (especially if taken more often or at higher doses than prescribed) and withdrawal (if stops abruptly, loli if taken more often or at higher doses) including seizures and death  The patient was instructed to not drive or operate heavy machinery while taking benzodiazepines, as the medication can cause increased drowsiness  Patient verbalized understanding and would like to continue at current dose  - Upcoming therapy appointment on 5/18/2021  - RTC in 12 weeks  - Educated about healthy life style, risk of falls/sedation and addiction  Patient was receptive to education   - The patient was educated about 24 hour and weekend coverage for urgent situations accessed by calling [address] [facility] [address] main practice number  - Patient was educated to call [facility] ([phone]]) for behavioral crisis at anytime or 911 for any safety concerns, or go to nearest ER if his symptoms become overwhelming or unmanageable      Current Outpatient Medications   Medication Sig Dispense Refill    atorvastatin (LIPITOR) 20 mg tablet Take 1 tablet (20 mg total) by mouth daily (Patient not taking: Reported on 2/8/2021) 30 tablet 0    clonazePAM (KlonoPIN) 0 5 mg tablet Take 0 5 tablets (0 25 mg total) by mouth 2 (two) times a day as needed for anxiety 30 tablet 2    escitalopram (LEXAPRO) 10 mg tablet Take 1 tablet (10 mg total) by mouth daily 90 tablet 0    hydrOXYzine HCL (ATARAX) 25 mg tablet Take 1 tablet (25 mg total) by mouth every 6 (six) hours as needed for anxiety 90 tablet 2    lisinopril (ZESTRIL) 5 mg tablet Take 1 tablet (5 mg total) by mouth daily 90 tablet 1     No current facility-administered medications for this visit  Medications Risks/Benefits      Risks, Benefits And Possible Side Effects Of Medications:    Risks, benefits, and possible side effects of medications explained to [facility] and he verbalizes understanding and agreement for treatment  Controlled Medication Discussion:     [facility] has been filling controlled prescriptions on time as prescribed according to [facility]  Discussed with [facility] the risks of sedation, respiratory depression, impairment of ability to drive and potential for abuse and addiction related to treatment with benzodiazepine medications  He understands risk of treatment with benzodiazepine medications, agrees to not drive if feels impaired and agrees to take medications as prescribed  Discussed with Mariam [facility] warning on concurrent use of benzodiazepines and opioid medications including sedation, respiratory depression, coma and death  He understands the risk of treatment with benzodiazepines in addition to opioids and wants to continue taking those medications    Psychotherapy Provided:     Individual psychotherapy provided: Yes  Counseling was provided during the session today for 16 minutes     Psychoeducation provided to the patient and was educated about the importance of compliance with the medications and psychiatric treatment  Supportive psychotherapy provided to the patient  Solution Focused Brief Therapy (SFBT) provided  Patient's emotions were validated and specific labeled praise provided  [place] suggestions were offered in a supportive non-critical way       Treatment Plan:    Completed and signed during the session: Not applicable - Treatment Plan not due at this session    Jamila Lopez MD 04/08/21

## 2021-04-08 ENCOUNTER — OFFICE VISIT (OUTPATIENT)
Dept: PSYCHIATRY | Facility: CLINIC | Age: 56
End: 2021-04-08
Payer: COMMERCIAL

## 2021-04-08 VITALS — HEIGHT: 68 IN | BODY MASS INDEX: 25.39 KG/M2

## 2021-04-08 DIAGNOSIS — F41.0 PANIC DISORDER: Primary | ICD-10-CM

## 2021-04-08 DIAGNOSIS — F63.3 TRICHOTILLOMANIA: Chronic | ICD-10-CM

## 2021-04-08 DIAGNOSIS — F42.2 MIXED OBSESSIONAL THOUGHTS AND ACTS: ICD-10-CM

## 2021-04-08 PROCEDURE — 99213 OFFICE O/P EST LOW 20 MIN: CPT | Performed by: STUDENT IN AN ORGANIZED HEALTH CARE EDUCATION/TRAINING PROGRAM

## 2021-04-08 RX ORDER — ESCITALOPRAM OXALATE 10 MG/1
10 TABLET ORAL DAILY
Qty: 90 TABLET | Refills: 0 | Status: SHIPPED | OUTPATIENT
Start: 2021-04-08 | End: 2021-07-12 | Stop reason: SDDI

## 2021-04-08 RX ORDER — CLONAZEPAM 0.5 MG/1
0.25 TABLET ORAL 2 TIMES DAILY PRN
Qty: 30 TABLET | Refills: 2 | Status: SHIPPED | OUTPATIENT
Start: 2021-04-08 | End: 2021-05-17

## 2021-04-08 RX ORDER — HYDROXYZINE HYDROCHLORIDE 25 MG/1
25 TABLET, FILM COATED ORAL EVERY 6 HOURS PRN
Qty: 90 TABLET | Refills: 2 | Status: SHIPPED | OUTPATIENT
Start: 2021-04-08 | End: 2021-07-07

## 2021-04-23 ENCOUNTER — TELEPHONE (OUTPATIENT)
Dept: UROLOGY | Facility: CLINIC | Age: 56
End: 2021-04-23

## 2021-05-17 ENCOUNTER — OFFICE VISIT (OUTPATIENT)
Dept: FAMILY MEDICINE CLINIC | Facility: CLINIC | Age: 56
End: 2021-05-17

## 2021-05-17 VITALS
BODY MASS INDEX: 26.15 KG/M2 | SYSTOLIC BLOOD PRESSURE: 138 MMHG | HEART RATE: 70 BPM | WEIGHT: 172 LBS | RESPIRATION RATE: 17 BRPM | OXYGEN SATURATION: 98 % | DIASTOLIC BLOOD PRESSURE: 90 MMHG | TEMPERATURE: 97.2 F

## 2021-05-17 DIAGNOSIS — Z53.20 COLONOSCOPY REFUSED: ICD-10-CM

## 2021-05-17 DIAGNOSIS — F41.0 PANIC DISORDER: Primary | ICD-10-CM

## 2021-05-17 DIAGNOSIS — I10 HYPERTENSION, UNSPECIFIED TYPE: ICD-10-CM

## 2021-05-17 PROBLEM — Z78.9 NEED FOR FOLLOW-UP BY SOCIAL WORKER: Status: RESOLVED | Noted: 2020-02-27 | Resolved: 2021-05-17

## 2021-05-17 PROCEDURE — 99214 OFFICE O/P EST MOD 30 MIN: CPT | Performed by: NURSE PRACTITIONER

## 2021-05-17 RX ORDER — LISINOPRIL 5 MG/1
5 TABLET ORAL DAILY
Qty: 90 TABLET | Refills: 1 | Status: SHIPPED | OUTPATIENT
Start: 2021-05-17

## 2021-05-17 NOTE — PATIENT INSTRUCTIONS
HPV (Human Papillomavirus)   AMBULATORY CARE:   Human Papillomavirus (HPV)  is the most common infection spread by sexual contact  It can also be spread from a mother to her baby during delivery  HPV may cause oral and genital warts or tumors in your nose, mouth, throat, and lungs  HPV may also cause vaginal, penile, and anal cancers  You may not show symptoms of any of these conditions for several years after being exposed to HPV  Common symptoms include the following:   · Painless warts    · Genital or anal discharge, bleeding, itching, or pain    · Pain when you urinate    Call your doctor if:   · You have warts in your genital or anal area  · You have genital or anal discharge, bleeding, itching, or pain  · You have pain when you urinate  · You have questions or concerns about your condition or care  HPV diagnosis:  Your healthcare provider may use a vinegar liquid to help diagnose HPV genital warts  Women 27to 72years old can be checked for HPV during regular cervical cancer screenings  An HPV test checks for certain types of HPV that can cause changes in cervical cells  Without treatment, the changed cells can become cancer  An HPV test can be done every 5 years if the results show no infection  The test can be done with or without a Pap smear  A Pap smear checks for cancer or for abnormal cells that can become cancer  You may be tested for HPV if you are diagnosed with a mouth or throat cancer  Treatment:  HPV cannot be cured  Conditions caused by HPV can be treated  You will need to be monitored closely for these conditions  Ask your healthcare provider for more information about monitoring, conditions caused by HPV, and available treatments  Prevent HPV infection:   · Ask about the HPV vaccine  The vaccine can help protect against HPV infection  Females and males can receive the vaccine  It is most effective if given before sexual activity begins   This allows the body to build almost complete protection against HPV before contact with the virus  The vaccine is usually given at 6or 15years of age but may be given as early as 5 years  The vaccine can be given through age 39  · Always use a condom during intercourse  A condom will not completely protect you from HPV infection, but it will help lower your risk  Use a new condom or latex barrier each time you have sex  This includes oral, vaginal, and anal sex  Make sure the condom fits and is put on correctly  Rubber latex sheets or dental dams can be used for oral sex  If you are allergic to latex, use a nonlatex product such as polyurethane  Follow up with your healthcare provider as directed:  Write down your questions so you remember to ask them during your visits  © Copyright [address] Information is for End User's use only and may not be sold, redistributed or otherwise used for commercial purposes  All illustrations and images included in CareNotes® are the copyrighted property of A D A M [employer] Inc  or [address] [place], [place] [address]karen   The above information is an  only  It is not intended as medical advice for individual conditions or treatments  Talk to your doctor, nurse or pharmacist before following any medical regimen to see if it is safe and effective for you

## 2021-05-17 NOTE — PROGRESS NOTES
Assessment/Plan:    Hypertension  BP at goal today   Continue with lisinopril 5 mg daily     Panic disorder  Following with psychiatry   Current regimen: Lexapro 10 mg daily and hydroxyzine 25 mg PRN     Puja Colmenares was seen today for physical exam     Diagnoses and all orders for this visit:    Panic disorder    Hypertension, unspecified type  -     lisinopril (ZESTRIL) 5 mg tablet; Take 1 tablet (5 mg total) by mouth daily    Colonoscopy refused        Return in about 6 months (around 11/17/2021) for Recheck  Patient Instructions   HPV (Human Papillomavirus)   AMBULATORY CARE:   Human Papillomavirus (HPV)  is the most common infection spread by sexual contact  It can also be spread from a mother to her baby during delivery  HPV may cause oral and genital warts or tumors in your nose, mouth, throat, and lungs  HPV may also cause vaginal, penile, and anal cancers  You may not show symptoms of any of these conditions for several years after being exposed to HPV  Common symptoms include the following:   · Painless warts    · Genital or anal discharge, bleeding, itching, or pain    · Pain when you urinate    Call your doctor if:   · You have warts in your genital or anal area  · You have genital or anal discharge, bleeding, itching, or pain  · You have pain when you urinate  · You have questions or concerns about your condition or care  HPV diagnosis:  Your healthcare provider may use a vinegar liquid to help diagnose HPV genital warts  Women 27to 72years old can be checked for HPV during regular cervical cancer screenings  An HPV test checks for certain types of HPV that can cause changes in cervical cells  Without treatment, the changed cells can become cancer  An HPV test can be done every 5 years if the results show no infection  The test can be done with or without a Pap smear  A Pap smear checks for cancer or for abnormal cells that can become cancer   You may be tested for HPV if you are diagnosed with a mouth or throat cancer  Treatment:  HPV cannot be cured  Conditions caused by HPV can be treated  You will need to be monitored closely for these conditions  Ask your healthcare provider for more information about monitoring, conditions caused by HPV, and available treatments  Prevent HPV infection:   · Ask about the HPV vaccine  The vaccine can help protect against HPV infection  Females and males can receive the vaccine  It is most effective if given before sexual activity begins  This allows the body to build almost complete protection against HPV before contact with the virus  The vaccine is usually given at 6or 15years of age but may be given as early as 5 years  The vaccine can be given through age 39  · Always use a condom during intercourse  A condom will not completely protect you from HPV infection, but it will help lower your risk  Use a new condom or latex barrier each time you have sex  This includes oral, vaginal, and anal sex  Make sure the condom fits and is put on correctly  Rubber latex sheets or dental dams can be used for oral sex  If you are allergic to latex, use a nonlatex product such as polyurethane  Follow up with your healthcare provider as directed:  Write down your questions so you remember to ask them during your visits  © Copyright [address] [place] [address] Information is for End User's use only and may not be sold, redistributed or otherwise used for commercial purposes  All illustrations and images included in CareNotes® are the copyrighted property of A D A M , Inc  or [facility]   The above information is an  only  It is not intended as medical advice for individual conditions or treatments  Talk to your doctor, nurse or pharmacist before following any medical regimen to see if it is safe and effective for you  Subjective:     Delia Braden is a 63 y o  male who  has a past medical history of Anxiety   who presented to the office today for follow up  Patient states that he plans on moving to [place] in the next 6 months  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  Otherwise he reports that he is doing well  He cancelled his urology appointment because he is no longer having the testicle pain  The following portions of the patient's history were reviewed and updated as appropriate: allergies, current medications, past family history, past medical history, past social history, past surgical history and problem list     Current Outpatient Medications on File Prior to Visit   Medication Sig Dispense Refill    escitalopram (LEXAPRO) 10 mg tablet Take 1 tablet (10 mg total) by mouth daily 90 tablet 0    hydrOXYzine HCL (ATARAX) 25 mg tablet Take 1 tablet (25 mg total) by mouth every 6 (six) hours as needed for anxiety 90 tablet 2    [DISCONTINUED] lisinopril (ZESTRIL) 5 mg tablet Take 1 tablet (5 mg total) by mouth daily 90 tablet 1    atorvastatin (LIPITOR) 20 mg tablet Take 1 tablet (20 mg total) by mouth daily (Patient not taking: Reported on 2/8/2021) 30 tablet 0    [DISCONTINUED] clonazePAM (KlonoPIN) 0 5 mg tablet Take 0 5 tablets (0 25 mg total) by mouth 2 (two) times a day as needed for anxiety (Patient not taking: Reported on 5/17/2021) 30 tablet 2     No current facility-administered medications on file prior to visit  Review of Systems   Constitutional: Negative for chills and fever  HENT: Negative for ear pain and sore throat  Eyes: Negative for pain and visual disturbance  Respiratory: Negative for cough and shortness of breath  Cardiovascular: Negative for chest pain and palpitations  Gastrointestinal: Negative for abdominal pain and vomiting  Genitourinary: Negative for dysuria and hematuria  Musculoskeletal: Negative for arthralgias and back pain  Skin: Negative for color change and rash  Neurological: Negative for seizures and syncope  All other systems reviewed and are negative        BMI Counseling: Body mass index is 26 15 kg/m²  The BMI is above normal  Nutrition recommendations include decreasing portion sizes  Exercise recommendations include exercising 3-5 times per week  Objective:    /90 (BP Location: Left arm, Patient Position: Sitting, Cuff Size: Adult)   Pulse 70   Temp (!) 97 2 °F (36 2 °C) (Temporal)   Resp 17   Wt 78 kg (172 lb)   SpO2 98%   BMI 26 15 kg/m²     Physical Exam  Vitals signs and nursing note reviewed  Constitutional:       General: He is not in acute distress  Appearance: He is well-developed  He is not diaphoretic  HENT:      Head: Normocephalic and atraumatic  Right Ear: External ear normal       Left Ear: External ear normal    Eyes:      Conjunctiva/sclera: Conjunctivae normal       Pupils: Pupils are equal, round, and reactive to light  Neck:      Musculoskeletal: Normal range of motion and neck supple  Cardiovascular:      Rate and Rhythm: Normal rate and regular rhythm  Heart sounds: Normal heart sounds  No friction rub  Pulmonary:      Effort: Pulmonary effort is normal  No respiratory distress  Breath sounds: Normal breath sounds  No wheezing  Abdominal:      General: Bowel sounds are normal  There is no distension  Palpations: Abdomen is soft  Tenderness: There is no abdominal tenderness  There is no guarding or rebound  Musculoskeletal: Normal range of motion  General: No deformity  Lymphadenopathy:      Cervical: No cervical adenopathy  Skin:     General: Skin is warm and dry  Capillary Refill: Capillary refill takes less than 2 seconds  Findings: No rash  Neurological:      General: No focal deficit present  Mental Status: He is alert and oriented to person, place, and time  Sensory: No sensory deficit        Coordination: Coordination normal       Deep Tendon Reflexes: Reflexes normal    Psychiatric:         Behavior: Behavior normal          Sallie Suero ALYSSA  05/17/21  5:19 PM

## 2021-06-28 ENCOUNTER — TELEPHONE (OUTPATIENT)
Dept: PSYCHIATRY | Facility: CLINIC | Age: 56
End: 2021-06-28

## 2021-06-28 NOTE — TELEPHONE ENCOUNTER
----- Message from Tamia Wei sent at 6/28/2021  1:21 PM EDT -----  Regarding: appt resched  Patient called to cx his 7/1 appt due to scheduling conflict  He rescheduled to a later date

## 2021-07-11 NOTE — PSYCH
MEDICATION MANAGEMENT NOTE        [facility]      Name and Date of Birth:  Beatriz Lopez 61 y o  1965 MRN: [id number]    Date of Visit: July 12, 2021    Reason for Visit:   Chief Complaint   Patient presents with    Medication Management    Anxiety    Panic Attack    OCD    Trichotillomania       SUBJECTIVE:  The patient arrived on time to his appointment for medication management and follow up visit for anxiety, panic attacks, OCD and trichotillomania  Presented calm, and cooperative  Reported feeling good  He enjoyed his trip to [place] visited his son and family, and has started working a seasonal job in [employer], and 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  Denied any hair picking lately, and denied any intense OCD sxs, noted that he only checks the door to be sure it is closed and feels safer in his neighborhood lately   Denied any changes in sleep, appetite, concentration, energy level, or daily activities  Denied feelings of anhedonia, hopelessness, helplessness, worthlessness or guilt and appeared to be future oriented  There was no thought constriction related to death  Denied SI/HI, intent or plan upon direct inquiry at this time  Denied AV/H  No anxiety sxs, specific phobia or panic attacks reported  No manic sxs, paranoid ideations or fixed delusions were elicited  Reported partial compliance with Lexapro, as taking 1/4 of pill for past couple of month and at times 1/4 BID as he noticed sexual side effects, and at the same time, his sxs has been stable  Denied taking any Klonopin since last visit  Denied smoking cigarettes, binge drinking alcohol or other illicit substance use  Given this presentation and good control of mood and anxiety sxs, Lexapro discontinued, and only continued on Atarax PRN for breakthrough anxiety  The patient declined individual therapy at this time, and feels stable   The patient was educated to call 911 or go to the nearest emergency room if the symptoms become overwhelming or unable to remain in control  Verbalized understanding and agreed to seek help in case of distress or concern for safety  Review Of Systems:  Pertinent items are noted in HPI; all others are negative; no recent changes in medications or health status reported  PHQ-9 Depression Screening    PHQ-9:   Frequency of the following problems over the past two weeks:      Little interest or pleasure in doing things: 0 - not at all  Feeling down, depressed, or hopeless: 0 - not at all  Trouble falling or staying asleep, or sleeping too much: 0 - not at all  Feeling tired or having little energy: 0 - not at all  Poor appetite or overeatin - not at all  Feeling bad about yourself - or that you are a failure or have let yourself or your family down: 0 - not at all  Trouble concentrating on things, such as reading the newspaper or watching television: 0 - not at all  Moving or speaking so slowly that other people could have noticed  Or the opposite - being so fidgety or restless that you have been moving around a lot more than usual: 0 - not at all  Thoughts that you would be better off dead, or of hurting yourself in some way: 0 - not at all  PHQ-2 Score: 0  PHQ-9 Score: 0         BRENNON-7 Flowsheet Screening      Most Recent Value   Over the last 2 weeks, how often have you been bothered by any of the following problems?    Feeling nervous, anxious, or on edge  1   Not being able to stop or control worrying  0   Worrying too much about different things  0   Trouble relaxing  1   Being so restless that it is hard to sit still  0   Becoming easily annoyed or irritable  0   Feeling afraid as if something awful might happen  0   BRENNON-7 Total Score  2            Past Psychiatric History Update:   - No inpatient psychiatric admission since last encounter  - No SA or SIB since last encounter  - No incidence of violent behavior since last encounter    Past Trauma History Update:    - No new onset of abuse or traumatic events since last encounter     Past Medical History:    Past Medical History:   Diagnosis Date    Anxiety      No past medical history pertinent negatives  History reviewed  No pertinent surgical history  No Known Allergies    Substance Abuse History:    Social History     Substance and Sexual Activity   Alcohol Use Never     Social History     Substance and Sexual Activity   Drug Use Never       Social History:    Social History     Socioeconomic History    Marital status:      Spouse name: Not on file    Number of children: Not on file    Years of education: Not on file    Highest education level: Not on file   Occupational History    Not on file   Tobacco Use    Smoking status: Never Smoker    Smokeless tobacco: Never Used   Substance and Sexual Activity    Alcohol use: Never    Drug use: Never    Sexual activity: Not on file   Other Topics Concern    Not on file   Social History Narrative    Not on file     Social Determinants of Health     Financial Resource Strain:     Difficulty of Paying Living Expenses:    Food Insecurity:     Worried About Running Out of Food in the Last Year:     [address] in the Last Year:    Transportation Needs:     Lack of Transportation (Medical):      Lack of Transportation (Non-Medical):    Physical Activity:     Days of Exercise per Week:     Minutes of Exercise per Session:    Stress:     Feeling of Stress :    Social Connections:     Frequency of Communication with Friends and Family:     Frequency of Social Gatherings with Friends and Family:     Attends Alevism Services:     Active Member of Clubs or Organizations:     Attends Club or Organization Meetings:     Marital Status:    Intimate Partner Violence:     Fear of Current or Ex-Partner:     Emotionally Abused:     Physically Abused:     Sexually Abused:        Family Psychiatric History: Family History   Problem Relation Age of Onset    Hypertension Mother     Cancer Mother     Hypertension Father        History Review: The following portions of the patient's history were reviewed and updated as appropriate: allergies, current medications, past family history, past medical history, past social history, past surgical history and problem list        OBJECTIVE:     Vital signs in last 24 hours:    Vitals:    07/12/21 1329   Weight: 79 8 kg (176 lb)   Height: 5' 8" (1 727 m)       Mental Status Evaluation:  Appearance and attitude: appeared as stated age, cooperative and attentive, casually dressed with good hygiene, wearing a facemask, and eye-glasses  Eye contact: good  Motor Function: within normal limits, intact gait, No PMA/PMR  Gait/station: normal gait/station and normal balance  Speech: normal for rate, rhythm, volume, latency, amount  Language: Able to name objects and Able to repeat phrases  Mood/affect: euthymic / Affect was euthymic, reactive, in full range, normal intensity and mood congruent  Thought Processes: sequential and goal-directed  Thought content: denies suicidal ideation or homicidal ideation; no delusions or first rank symptoms  Associations: intact associations  Perceptual disturbances: denies Auditory/Visual/Tactile Hallucinations  Orientation: oriented to time, person, place and to the situational context  Cognitive Function: intact  Memory: intact short-term and long-term  Intellect: average  Fund of knowledge: aware of current events, aware of past history and vocabulary average  Impulse control: good  Insight/judgment: good/good    Pain: denied  Pain scale: 0    Laboratory Results: I have personally reviewed all pertinent laboratory/tests results    Recent Labs (last 2 months):   No visits with results within 2 Month(s) from this visit     Latest known visit with results is:   Lab on 11/23/2020   Component Date Value    Sodium 11/23/2020 138     Potassium 11/23/2020 4 5     Chloride 11/23/2020 103     CO2 11/23/2020 28     ANION GAP 11/23/2020 7     BUN 11/23/2020 12     Creatinine 11/23/2020 1 19     Glucose, Fasting 11/23/2020 98     Calcium 11/23/2020 9 5     eGFR 11/23/2020 68          Assessment/Plan:   A 61 y o   male,  (has 24 y/o son), domiciled alone, 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), and also as an artist, w/ PMH of HTN, HLD and PPH of Panic disorder, no prior psychiatric admissions, no prior SA, no h/o self-injurious behavior, on Klonopin 0 5 mg po BID, recently in therapy w/ Ms  Madalyn Carmichael (last in 9/2020) who presented to the mental health clinic for the initial intake and psychiatric evaluation on 2/8/2021  Presented w/ h/o anxiety, panic attacks at times, which has been more controlled lately w/ Klonopin PRN, and h/o trichotillomania since childhood  Denied SI/HI, intent or plan upon direct inquiry at this time  PHQ-9: 3; BRENNON-7: 5  His current presentation meets criteria for Panic Disorder, by history and Trichotillomania  Klonopin tapered down to 0 25 mg BID and Atarax 10 mg po TID PRN for anxiety provided, and later started on Lexapro 10 mg po daily which was discontinued on 7/12/2021 due to non-compliance and good control of anxiety, mood and OCD sxs  BRENNON-7: 2; PHQ-9: 0      Diagnoses and all orders for this visit:    Mixed obsessional thoughts and acts    Trichotillomania    Panic disorder          Impression:  1  Mixed obsessional thoughts and acts     2  Trichotillomania     3   Panic disorder         Treatment Recommendations/Precautions:  - Discontinue Lexapro due to non-compliance and sexual side effects  - Consider Buspar if indicated subsequently or antidepressants for OCD/ mood sxs if reccured  - Atarax 25 mg po PRN for anxiety / panic attacks  - Klonipin 0 25 mg po nightly PRN for anxiety; to be tapered off as tolerated    - Patient has enough medication at this time    - Psychoeducation provided to the patient and benefits, potential risks and side effects discussed; importance of compliance with the psychiatric treatment reiterated, and the patient verbalized understanding of the matter     - Educated about healthy life style, risk of falls/sedation and addiction  Patient was receptive to education   - The patient was educated about 24 hour and weekend coverage for urgent situations accessed by calling [facility] [address] main practice number  - Patient was educated to call [address] [facility] ([phone]]) for behavioral crisis at anytime or 911 for any safety concerns, or go to nearest ER if his symptoms become overwhelming or unmanageable  Current Outpatient Medications   Medication Sig Dispense Refill    atorvastatin (LIPITOR) 20 mg tablet Take 1 tablet (20 mg total) by mouth daily (Patient not taking: Reported on 2/8/2021) 30 tablet 0    hydrOXYzine HCL (ATARAX) 25 mg tablet Take 1 tablet (25 mg total) by mouth every 6 (six) hours as needed for anxiety 90 tablet 2    lisinopril (ZESTRIL) 5 mg tablet Take 1 tablet (5 mg total) by mouth daily 90 tablet 1     No current facility-administered medications for this visit  Medications Risks/Benefits      Risks, Benefits And Possible Side Effects Of Medications:    Risks, benefits, and possible side effects of medications explained to [facility] and he verbalizes understanding and agreement for treatment  Controlled Medication Discussion:     [facility] has been filling controlled prescriptions on time as prescribed according to [facility]  Discussed with [facility] the risks of sedation, respiratory depression, impairment of ability to drive and potential for abuse and addiction related to treatment with benzodiazepine medications   He understands risk of treatment with benzodiazepine medications, agrees to not drive if feels impaired and agrees to take medications as prescribed  Discussed with [facility] warning on concurrent use of benzodiazepines and opioid medications including sedation, respiratory depression, coma and death  He understands the risk of treatment with benzodiazepines in addition to opioids and wants to continue taking those medications    Psychotherapy Provided:     Individual psychotherapy provided: Yes  Counseling was provided during the session today for 16 minutes  Psychoeducation provided to the patient and was educated about the importance of compliance with the medications and psychiatric treatment  Supportive psychotherapy provided to the patient  Psycho-spiritual therapy provided to the patient, and the importance of simultaneously engaging the body, mind, and spirit in healing mental health issues, moving beyond problematic life patterns, and overcoming traumatic life experiences reiterated  The patient was educated about the breathing techniques, guided imagery meditation and healthy life-style  Patient's emotions were validated and specific labeled praise provided  [place] suggestions were offered in a supportive non-critical way       Treatment Plan:    Completed and signed during the session: Yes - with Tian Christine MD 07/12/21

## 2021-07-12 ENCOUNTER — OFFICE VISIT (OUTPATIENT)
Dept: PSYCHIATRY | Facility: CLINIC | Age: 56
End: 2021-07-12
Payer: COMMERCIAL

## 2021-07-12 VITALS — HEIGHT: 68 IN | WEIGHT: 176 LBS | BODY MASS INDEX: 26.67 KG/M2

## 2021-07-12 DIAGNOSIS — F42.2 MIXED OBSESSIONAL THOUGHTS AND ACTS: Primary | ICD-10-CM

## 2021-07-12 DIAGNOSIS — F41.0 PANIC DISORDER: ICD-10-CM

## 2021-07-12 DIAGNOSIS — F63.3 TRICHOTILLOMANIA: ICD-10-CM

## 2021-07-12 PROCEDURE — 99214 OFFICE O/P EST MOD 30 MIN: CPT | Performed by: STUDENT IN AN ORGANIZED HEALTH CARE EDUCATION/TRAINING PROGRAM

## 2021-07-12 NOTE — BH TREATMENT PLAN
Treatment Plan done but not signed at time of office visit due to:  Plan reviewed with the patient during the virtual visit / in person and verbal consent given due to [address] distancing  TREATMENT PLAN (Medication Management Only)        [facility]    Name and Date of Birth:  Anne Marie Gan 61 y o  1965  Date of Treatment Plan: July 12, 2021  Diagnosis/Diagnoses:    1  Mixed obsessional thoughts and acts    2  Trichotillomania    3  Panic disorder      Strengths/Personal Resources for Self-Care: "Spirituality, Evelio Nolan and his son"  Area/Areas of need (in own words): "mood, anxiety, panic attacks, OCD and trichtillomania"  1  Long Term Goal: maintain control of mood, anxiety, panic attacks, OCD and trichtillomania  Target Date:4 months - 11/12/2021  Person/Persons responsible for completion of goal: Joseph  2  Short Term Objective (s) - How will we reach this goal?:   A  Provider new recommended medication/dosage changes and/or continue medication(s): continue current medications as prescribed  B  N/A   C  N/A  Target Date:4 months - 11/12/2021  Person/Persons Responsible for Completion of Goal: Sonia Oviedo  Progress Towards Goals: stable, continuing treatment  Treatment Modality: medication management every 4 months  Review due 180 days from date of this plan: 4 months - 11/12/2021  Expected length of service: maintenance  My Physician/PA/NP and I have developed this plan together and I agree to work on the goals and objectives  I understand the treatment goals that were developed for my treatment

## 2021-11-08 ENCOUNTER — OFFICE VISIT (OUTPATIENT)
Dept: PSYCHIATRY | Facility: CLINIC | Age: 56
End: 2021-11-08
Payer: COMMERCIAL

## 2021-11-08 VITALS — BODY MASS INDEX: 26.31 KG/M2 | HEIGHT: 68 IN | WEIGHT: 173.6 LBS

## 2021-11-08 DIAGNOSIS — F42.2 MIXED OBSESSIONAL THOUGHTS AND ACTS: ICD-10-CM

## 2021-11-08 DIAGNOSIS — F63.3 TRICHOTILLOMANIA: ICD-10-CM

## 2021-11-08 DIAGNOSIS — F41.0 PANIC DISORDER: Primary | ICD-10-CM

## 2021-11-08 PROCEDURE — 99213 OFFICE O/P EST LOW 20 MIN: CPT | Performed by: STUDENT IN AN ORGANIZED HEALTH CARE EDUCATION/TRAINING PROGRAM

## 2021-11-08 RX ORDER — CLONAZEPAM 0.5 MG/1
0.5 TABLET ORAL
Qty: 30 TABLET | Refills: 0 | Status: SHIPPED | OUTPATIENT
Start: 2021-11-08 | End: 2022-05-02 | Stop reason: SDUPTHER

## 2022-04-29 NOTE — PSYCH
MEDICATION MANAGEMENT NOTE        [facility]      Name and Date of Birth:  Munria Connors 61 y o  1965 MRN: [id number]    Date of Visit: May 2, 2022    Reason for Visit:   Chief Complaint   Patient presents with    Medication Management    OCD    Anxiety    Panic Attack    Trichotillomania       SUBJECTIVE:  The patient arrived 30 minutes earlier to his appointment for medication management and follow up visit for OCD, trichtillomania  Presented calm, and cooperative  Reported feeling good in general  However, he noted that his father had a stroke last year, and has been declining, and he went to [place], and his father was placed in [facility] and he spent a couple of days with him  He noted that looking at him cachectic, freaked him out, and "paranoia creeped up"  As per patient, his father's wife was acting strange and his brother was acting strange, carrying a gun, and "the whole scene" freaked him out  He noted that his brother does not talk to him, and just texted him to "fuck off"  He moved to [facility], and 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  She endorsed good relationship with her partner  Otherwise, reported feeling good, He denied any full blown panic attacks, and did not need to take Klonopin more than a quarter at most per day, and is "doing really good"  His checking OCD sxs are less in [facility] as per patient and denied feeling frustrated by OCD sxs  Reported improvement in his hair pulling  He has listed his house in the area, and may sell it  Sleeps 7-8 hours nightly  Denied any changes in appetite, concentration, energy level, or daily activities  Denied feelings of anhedonia, hopelessness, helplessness, worthlessness or guilt and appeared to be future oriented  There was no thought constriction related to death  Denied SI/HI, intent or plan upon direct inquiry at this time  Denied AV/H   No anxiety sxs, specific phobia or panic attacks reported  No manic sxs, paranoid ideations or fixed delusions were elicited  Endorsed good compliance with the medications and denied any side effects  Denied smoking cigarettes, drinking alcohol or other illicit substance use  Risks and side effects of chronic benzodiazepine use discussed with patient, including risk for falls, sedation, respiratory depression (especially if taken in higher dose(s) than prescribed or if taken together with another sedating medication or alcohol or other sedating substance), as well as risk of addiction (especially if taken more often or at higher doses than prescribed) and withdrawal (if stops abruptly, loli if taken more often or at higher doses) including seizures and death  The patient was instructed to not drive or operate heavy machinery while taking benzodiazepines, as the medication can cause increased drowsiness  Patient verbalized understanding and would like to continue at current dose  Given this presentation, medications are maintained at the same dosage  The patient was educated to call 911 or go to the nearest emergency room if the symptoms become overwhelming or unable to remain in control  Verbalized understanding and agreed to seek help in case of distress or concern for safety  Review Of Systems:  Pertinent items are noted in HPI; all others are negative; no recent changes in medications or health status reported        PHQ-2/9 Depression Screening    Little interest or pleasure in doing things: 1 - several days  Feeling down, depressed, or hopeless: 0 - not at all  Trouble falling or staying asleep, or sleeping too much: 0 - not at all  Feeling tired or having little energy: 1 - several days  Poor appetite or overeatin - not at all  Feeling bad about yourself - or that you are a failure or have let yourself or your family down: 0 - not at all  Trouble concentrating on things, such as reading the newspaper or watching television: 0 - not at all  Moving or speaking so slowly that other people could have noticed  Or the opposite - being so fidgety or restless that you have been moving around a lot more than usual: 0 - not at all  Thoughts that you would be better off dead, or of hurting yourself in some way: 0 - not at all  PHQ-9 Score: 2   PHQ-9 Interpretation: No or Minimal depression                Past Psychiatric History Update:   - No inpatient psychiatric admission since last encounter  - No SA or SIB since last encounter  - No incidence of violent behavior since last encounter    Past Trauma History Update:    - No new onset of abuse or traumatic events since last encounter     Past Medical History:    Past Medical History:   Diagnosis Date    Anxiety      No past medical history pertinent negatives  History reviewed  No pertinent surgical history    No Known Allergies    Substance Abuse History:    Social History     Substance and Sexual Activity   Alcohol Use Never     Social History     Substance and Sexual Activity   Drug Use Never       Social History:    Social History     Socioeconomic History    Marital status:      Spouse name: Not on file    Number of children: Not on file    Years of education: Not on file    Highest education level: Not on file   Occupational History    Not on file   Tobacco Use    Smoking status: Never Smoker    Smokeless tobacco: Never Used   Substance and Sexual Activity    Alcohol use: Never    Drug use: Never    Sexual activity: Not on file   Other Topics Concern    Not on file   Social History Narrative    Not on file     Social Determinants of Health     Financial Resource Strain: Not on file   Food Insecurity: Not on file   Transportation Needs: Not on file   Physical Activity: Not on file   Stress: Not on file   Social Connections: Not on file   Intimate Partner Violence: Not on file   Housing Stability: Not on file       Family Psychiatric History:     Family History   Problem Relation Age of Onset    Hypertension Mother     Cancer Mother     Hypertension Father        History Review: The following portions of the patient's history were reviewed and updated as appropriate: allergies, current medications, past family history, past medical history, past social history, past surgical history and problem list        OBJECTIVE:     Vital signs in last 24 hours:    Vitals:    05/02/22 1005   Weight: 79 8 kg (176 lb)   Height: 5' 8" (1 727 m)       Mental Status Evaluation:  Appearance and attitude: appeared as stated age, cooperative and attentive, casually dressed, wearing eyeglasses, with good hygiene  Eye contact: good  Motor Function: within normal limits, intact gait, No PMA/PMR  Gait/station: normal gait/station and normal balance  Speech: normal for rate, rhythm, volume, latency, amount  Language: No overt abnormality  Mood/affect: euthymic / Affect was euthymic, reactive, in full range, normal intensity and mood congruent  Thought Processes: sequential and goal-directed  Thought content: denies suicidal ideation or homicidal ideation; no delusions or first rank symptoms  Associations: intact associations  Perceptual disturbances: denies Auditory/Visual/Tactile Hallucinations  Orientation: oriented to time, person, place and to the situational context  Cognitive Function: intact  Memory: recent and remote memory grossly intact  Intellect: average  Fund of knowledge: aware of current events, aware of past history and vocabulary average  Impulse control: good  Insight/judgment: good/good    Laboratory Results: I have personally reviewed all pertinent laboratory/tests results    Recent Labs (last 2 months):   No visits with results within 2 Month(s) from this visit     Latest known visit with results is:   Lab on 11/23/2020   Component Date Value    Sodium 11/23/2020 138     Potassium 11/23/2020 4 5     Chloride 11/23/2020 103     CO2 11/23/2020 28     ANION GAP 11/23/2020 7     BUN 11/23/2020 12     Creatinine 11/23/2020 1 19     Glucose, Fasting 11/23/2020 98     Calcium 11/23/2020 9 5     eGFR 11/23/2020 68          Assessment/Plan:   A 56 y  o   male,  (has 26 y/o son), domiciled alone, employed at [employer] (past-time), and also as an artist, w/ PMH of HTN, HLD and PPH of Panic disorder, no prior psychiatric admissions, no prior SA, no h/o self-injurious behavior, on Klonopin 0 5 mg po BID, recently in therapy w/ Ms  Terrance Cuevas (last in 9/2020) who presented to the mental health clinic for the initial intake and psychiatric evaluation on 2/8/2021  Presented w/ h/o anxiety, panic attacks at times, which has been more controlled lately w/ Klonopin PRN, and h/o trichotillomania since childhood  Denied SI/HI, intent or plan upon direct inquiry at this time  PHQ-9: 3; BRENNON-7: 5  His current presentation meets criteria for Panic Disorder, by history and Trichotillomania  Klonopin tapered down to 0 25 mg BID and Atarax 10 mg po TID PRN for anxiety provided, and later started on Lexapro 10 mg po daily which was discontinued on 7/12/2021 due to non-compliance and good control of anxiety, mood and OCD sxs   BRENNON-7: 2; PHQ-9: 0       Diagnoses and all orders for this visit:    Panic disorder  -     clonazePAM (KlonoPIN) 0 5 mg tablet; Take 1 tablet (0 5 mg total) by mouth daily at bedtime as needed for anxiety    Other orders  -     tiZANidine (ZANAFLEX) 4 mg tablet; TAKE 1 TABLET BY MOUTH EVERY 6 HOURS AS NEEDED FOR MUSCLE SPASM          Impression:  1   Panic disorder  clonazePAM (KlonoPIN) 0 5 mg tablet       Treatment Recommendations/Precautions:  - Atarax 25 mg po PRN for anxiety / panic attacks  - Klonipin 0 25-0 5 mg po nightly PRN for anxiety; to be tapered off as tolerated    - Medications sent to patient's pharmacy for 30 day supply x2 refills     - Psychoeducation provided to the patient and benefits, potential risks and side effects discussed; importance of compliance with the psychiatric treatment reiterated, and the patient verbalized understanding of the matter     - RTC in 24 weeks    - Educated about healthy life style, risk of falls/sedation and addiction  Patient was receptive to education   - The patient was educated about 24 hour and weekend coverage for urgent situations accessed by calling [address] main practice number  - Patient was educated to call [address] [facility] ([phone] [[phone]]) for behavioral crisis at anytime or 911 for any safety concerns, or go to nearest ER if his symptoms become overwhelming or unmanageable  Current Outpatient Medications   Medication Sig Dispense Refill    atorvastatin (LIPITOR) 20 mg tablet Take 1 tablet (20 mg total) by mouth daily (Patient not taking: Reported on 2/8/2021) 30 tablet 0    clonazePAM (KlonoPIN) 0 5 mg tablet Take 1 tablet (0 5 mg total) by mouth daily at bedtime as needed for anxiety 30 tablet 2    hydrOXYzine HCL (ATARAX) 25 mg tablet Take 1 tablet (25 mg total) by mouth every 6 (six) hours as needed for anxiety 90 tablet 2    lisinopril (ZESTRIL) 5 mg tablet Take 1 tablet (5 mg total) by mouth daily 90 tablet 1    tiZANidine (ZANAFLEX) 4 mg tablet TAKE 1 TABLET BY MOUTH EVERY 6 HOURS AS NEEDED FOR MUSCLE SPASM       No current facility-administered medications for this visit  Medications Risks/Benefits      Risks, Benefits And Possible Side Effects Of Medications:    Risks, benefits, and possible side effects of medications explained to Earnest Bernheim and he verbalizes understanding and agreement for treatment      Controlled Medication Discussion:     Earnest Bernheim has been filling controlled prescriptions on time as prescribed according to [facility]  Discussed with Earnest Bernheim the risks of sedation, respiratory depression, impairment of ability to drive and potential for abuse and addiction related to treatment with benzodiazepine medications  He understands risk of treatment with benzodiazepine medications, agrees to not drive if feels impaired and agrees to take medications as prescribed  Discussed with Nurys Penaloza Beatrice warning on concurrent use of benzodiazepines and opioid medications including sedation, respiratory depression, coma and death  He understands the risk of treatment with benzodiazepines in addition to opioids and wants to continue taking those medications    Psychotherapy Provided:     Individual psychotherapy provided: Yes  Counseling was provided during the session today for 16 minutes  Psychoeducation provided to the patient and was educated about the importance of compliance with the medications and psychiatric treatment  Solution Focused Brief Therapy (SFBT) provided  Patient's emotions were validated and specific labeled praise provided  [place] suggestions were offered in a supportive non-critical way     Supportive psychotherapy and grief counseling provided    Treatment Plan:    Completed and signed during the session: Yes - with Irvin Voss MD 05/02/22

## 2022-05-02 ENCOUNTER — OFFICE VISIT (OUTPATIENT)
Dept: PSYCHIATRY | Facility: CLINIC | Age: 57
End: 2022-05-02
Payer: COMMERCIAL

## 2022-05-02 VITALS — HEIGHT: 68 IN | BODY MASS INDEX: 26.67 KG/M2 | WEIGHT: 176 LBS

## 2022-05-02 DIAGNOSIS — F41.0 PANIC DISORDER: ICD-10-CM

## 2022-05-02 PROCEDURE — 99214 OFFICE O/P EST MOD 30 MIN: CPT | Performed by: STUDENT IN AN ORGANIZED HEALTH CARE EDUCATION/TRAINING PROGRAM

## 2022-05-02 RX ORDER — TIZANIDINE 4 MG/1
TABLET ORAL
COMMUNITY
Start: 2022-04-15

## 2022-05-02 RX ORDER — CLONAZEPAM 0.5 MG/1
0.5 TABLET ORAL
Qty: 30 TABLET | Refills: 2 | Status: SHIPPED | OUTPATIENT
Start: 2022-05-02 | End: 2022-07-31

## 2022-05-02 NOTE — BH TREATMENT PLAN
TREATMENT PLAN (Medication Management Only)        [facility]    Name and Date of Birth:  Ezno Padilla 61 y o  1965  Date of Treatment Plan: May 2, 2022  Diagnosis/Diagnoses:    1  Panic disorder      Strengths/Personal Resources for Self-Care: "supporitve significant other and art"  Area/Areas of need (in own words): "mood and anxiety sxs"  1  Long Term Goal: maintain control of anxiety  OCD, mood sxs  Target Date:6 months - 11/2/2022  Person/Persons responsible for completion of goal: Joseph Loja  Short Term Objective (s) - How will we reach this goal?:   A  Provider new recommended medication/dosage changes and/or continue medication(s): continue current medications as prescribed  B  N/A   C  N/A  Target Date:6 months - 11/2/2022  Person/Persons Responsible for Completion of Goal: Josephine Draper  Progress Towards Goals: continuing treatment  Treatment Modality: medication management every 6 months  Review due 180 days from date of this plan: 6 months - 11/2/2022  Expected length of service: maintenance  My Physician/PA/NP and I have developed this plan together and I agree to work on the goals and objectives  I understand the treatment goals that were developed for my treatment

## 2022-09-28 ENCOUNTER — VBI (OUTPATIENT)
Dept: ADMINISTRATIVE | Facility: OTHER | Age: 57
End: 2022-09-28

## 2023-01-10 ENCOUNTER — DOCUMENTATION (OUTPATIENT)
Dept: PSYCHIATRY | Facility: CLINIC | Age: 58
End: 2023-01-10

## 2023-01-10 DIAGNOSIS — F42.2 MIXED OBSESSIONAL THOUGHTS AND ACTS: ICD-10-CM

## 2023-01-10 DIAGNOSIS — F63.3 TRICHOTILLOMANIA: ICD-10-CM

## 2023-01-10 DIAGNOSIS — F41.0 PANIC DISORDER: Primary | ICD-10-CM

## 2023-01-10 NOTE — PSYCH
[address]     Name and Date of Birth:  Micaela Glez 61 y o  1965    Admission Date: 2/8/2021  Discharge Date: 1/10/2023 Referral source: family physician    Discharge Type: Did not return for follow up    Discharge Diagnosis:     1  Panic disorder        2  Trichotillomania        3  Mixed obsessional thoughts and acts          Treating Physician: William Miller MD     Treatment Complications: Did not return for follow up  Admit with discharge: No    Prognosis at time of discharge: Good    Presenting Problems/Pertinent Findings:      Bev Yuen is a 56 y  o   male,  (has 24 y/o son), domiciled alone, 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), and also as an artist, w/ PMH of HTN, HLD and PPH of Panic disorder, no prior psychiatric admissions, no prior SA, no h/o self-injurious behavior, on Klonopin 0 5 mg po BID, recently in therapy w/ Ms Phil Vivar (last in 9/2020) who presented to the mental health clinic for the initial intake and psychiatric evaluation on 2/8/2021  Presented w/ h/o anxiety, panic attacks at times, which has been more controlled lately w/ Klonopin PRN, and h/o trichotillomania since childhood  Denied SI/HI, intent or plan upon direct inquiry at this time  PHQ-9: 3; BRENNON-7: 5  His current presentation meets criteria for Panic Disorder, by history and Trichotillomania  Therapist: None    Course of Treatment: Psychiatric Evaluation and Medication Management    Summary of Treatment Progress:     Bev Yuen was seen for initial Psychiatric Evaluation and medication management on 2/8/21   Presented w/ h/o anxiety, panic attacks at times, which has been more controlled lately w/ Klonopin PRN, and h/o trichotillomania since childhood  Denied SI/HI, intent or plan upon direct inquiry at this time  PHQ-9: 3; BRENNON-7: 5  His current presentation meets criteria for Panic Disorder, by history and Trichotillomania  Klonopin tapered down to 0 25 mg BID and Atarax 10 mg po TID PRN for anxiety provided, and later started on Lexapro 10 mg po daily which was discontinued on 7/12/2021 due to non-compliance and good control of anxiety, mood and OCD sxs   BRENNON-7: 2; PHQ-9: 0  The patient remained stable upon further f/u  He cancelled his f/u appointment on 10/10/22 and did not schedule a f/u visit despite outreach attempts  Hence, the patient is being discharged as he did not return for follow up  Past Psychiatric History:   Past Inpatient Psychiatric Treatment:   No history of past inpatient psychiatric admissions  Past Outpatient Psychiatric Treatment:    Most recently in outpatient psychiatric treatment with a family physician  Past Suicide Attempts: no  Past Violent Behavior: no  Past Psychiatric Medication Trials: Klonopin; Seroquel     Traumatic History:      Abuse: no history of physical or sexual abuse  Other Traumatic Events: none      Past Medical History:    Past Medical History:   Diagnosis Date   • Anxiety         No past surgical history on file      Allergies:    No Known Allergies    Substance Abuse History:     Social History     Substance and Sexual Activity   Drug Use Never     Social History     Substance and Sexual Activity   Alcohol Use Never       Family Psychiatric History:     Family History   Problem Relation Age of Onset   • Hypertension Mother    • Cancer Mother    • Hypertension Father        Social History/Trauma History/Past Psychiatric History:    Social History     Socioeconomic History   • Marital status:      Spouse name: Not on file   • Number of children: Not on file   • Years of education: Not on file   • Highest education level: Not on file   Occupational History   • Not on file   Tobacco Use   • Smoking status: Never   • Smokeless tobacco: Never   Substance and Sexual Activity   • Alcohol use: Never   • Drug use: Never   • Sexual activity: Not on file   Other Topics Concern   • Not on file   Social History Narrative   • Not on file     Social Determinants of Health     Financial Resource Strain: Not on file   Food Insecurity: Not on file   Transportation Needs: Not on file   Physical Activity: Not on file   Stress: Not on file   Social Connections: Not on file   Intimate Partner Violence: Not on file   Housing Stability: Not on file       History Review: The following portions of the patient's history were reviewed and updated as appropriate: allergies, current medications, past family history, past medical history, past social history, past surgical history and problem list  Reviewed on 5/2/22  MENTAL STATUS EVALUATION (at time of most recent visit on 5/2/22): Appearance and attitude: appeared as stated age, cooperative and attentive, casually dressed, wearing eyeglasses, with good hygiene  Eye contact: good  Motor Function: within normal limits, intact gait, No PMA/PMR  Gait/station: normal gait/station and normal balance  Speech: normal for rate, rhythm, volume, latency, amount  Language: No overt abnormality  Mood/affect: euthymic / Affect was euthymic, reactive, in full range, normal intensity and mood congruent  Thought Processes: sequential and goal-directed  Thought content: denies suicidal ideation or homicidal ideation; no delusions or first rank symptoms  Associations: intact associations  Perceptual disturbances: denies Auditory/Visual/Tactile Hallucinations  Orientation: oriented to time, person, place and to the situational context  Cognitive Function: intact  Memory: recent and remote memory grossly intact  Intellect: average  Fund of knowledge: aware of current events, aware of past history and vocabulary average  Impulse control: good  Insight/judgment: good/good    To what extent did Les Dietz achieve his goals?: Most    Criteria for Discharge: Did not return for treatment  Did not respond to reminder letter to reschedule follow up appointment   Has demonstrated failure to uphold their treatment plan/contract  Aftercare Recommendations:     • Follow up with therapist recommended  • Follow up with family physician for psychiatric issues      Discharge Medications:     Current Outpatient Medications:   •  atorvastatin (LIPITOR) 20 mg tablet, Take 1 tablet (20 mg total) by mouth daily (Patient not taking: Reported on 2/8/2021), Disp: 30 tablet, Rfl: 0  •  clonazePAM (KlonoPIN) 0 5 mg tablet, Take 1 tablet (0 5 mg total) by mouth daily at bedtime as needed for anxiety, Disp: 30 tablet, Rfl: 2  •  hydrOXYzine HCL (ATARAX) 25 mg tablet, Take 1 tablet (25 mg total) by mouth every 6 (six) hours as needed for anxiety, Disp: 90 tablet, Rfl: 2  •  lisinopril (ZESTRIL) 5 mg tablet, Take 1 tablet (5 mg total) by mouth daily, Disp: 90 tablet, Rfl: 1  •  tiZANidine (ZANAFLEX) 4 mg tablet, TAKE 1 TABLET BY MOUTH EVERY 6 HOURS AS NEEDED FOR MUSCLE SPASM, Disp: , Rfl:      Describe ability and willingness to work and solve mental problems:     Able to solve his mental health problems    Ren Melendez MD 01/10/23

## 2023-01-12 ENCOUNTER — TELEPHONE (OUTPATIENT)
Dept: PSYCHIATRY | Facility: CLINIC | Age: 58
End: 2023-01-12

## 2023-01-12 NOTE — TELEPHONE ENCOUNTER
DISCHARGE LETTER for Ren Melendez MD (certified and regular) placed in outgoing mail on 01/12/23      Article #:  [id number]    Address:  [address] [facility]

## 2023-02-28 ENCOUNTER — VBI (OUTPATIENT)
Dept: ADMINISTRATIVE | Facility: OTHER | Age: 58
End: 2023-02-28

## 2023-03-02 ENCOUNTER — VBI (OUTPATIENT)
Dept: ADMINISTRATIVE | Facility: OTHER | Age: 58
End: 2023-03-02

## 2023-09-14 ENCOUNTER — VBI (OUTPATIENT)
Dept: ADMINISTRATIVE | Facility: OTHER | Age: 58
End: 2023-09-14

## 2025-04-07 ENCOUNTER — TELEPHONE (OUTPATIENT)
Dept: FAMILY MEDICINE CLINIC | Facility: CLINIC | Age: 60
End: 2025-04-07

## 2025-04-17 ENCOUNTER — OFFICE VISIT (OUTPATIENT)
Dept: FAMILY MEDICINE CLINIC | Facility: CLINIC | Age: 60
End: 2025-04-17

## 2025-04-17 VITALS
OXYGEN SATURATION: 100 % | SYSTOLIC BLOOD PRESSURE: 118 MMHG | WEIGHT: 171 LBS | DIASTOLIC BLOOD PRESSURE: 70 MMHG | HEIGHT: 68 IN | TEMPERATURE: 97.2 F | RESPIRATION RATE: 16 BRPM | BODY MASS INDEX: 25.91 KG/M2 | HEART RATE: 87 BPM

## 2025-04-17 DIAGNOSIS — Z76.89 ENCOUNTER TO ESTABLISH CARE: ICD-10-CM

## 2025-04-17 DIAGNOSIS — B35.1 ONYCHOMYCOSIS: ICD-10-CM

## 2025-04-17 DIAGNOSIS — Z12.11 ENCOUNTER FOR SCREENING COLONOSCOPY: ICD-10-CM

## 2025-04-17 DIAGNOSIS — Z13.220 SCREENING FOR LIPID DISORDERS: ICD-10-CM

## 2025-04-17 DIAGNOSIS — Z13.1 SCREENING FOR DIABETES MELLITUS: ICD-10-CM

## 2025-04-17 DIAGNOSIS — Z00.00 ANNUAL PHYSICAL EXAM: Primary | ICD-10-CM

## 2025-04-17 DIAGNOSIS — Z13.29 SCREENING FOR THYROID DISORDER: ICD-10-CM

## 2025-04-17 DIAGNOSIS — Z23 ENCOUNTER FOR IMMUNIZATION: ICD-10-CM

## 2025-04-17 DIAGNOSIS — I10 PRIMARY HYPERTENSION: ICD-10-CM

## 2025-04-17 DIAGNOSIS — Z13.0 SCREENING FOR DEFICIENCY ANEMIA: ICD-10-CM

## 2025-04-17 DIAGNOSIS — Z13.228 SCREENING FOR METABOLIC DISORDER: ICD-10-CM

## 2025-04-17 PROCEDURE — 99386 PREV VISIT NEW AGE 40-64: CPT

## 2025-04-17 PROCEDURE — 90715 TDAP VACCINE 7 YRS/> IM: CPT

## 2025-04-17 PROCEDURE — 90750 HZV VACC RECOMBINANT IM: CPT

## 2025-04-17 PROCEDURE — 90472 IMMUNIZATION ADMIN EACH ADD: CPT

## 2025-04-17 PROCEDURE — 90471 IMMUNIZATION ADMIN: CPT

## 2025-04-17 PROCEDURE — 99204 OFFICE O/P NEW MOD 45 MIN: CPT

## 2025-04-17 RX ORDER — CICLOPIROX 80 MG/ML
SOLUTION TOPICAL
Qty: 6 ML | Refills: 1 | Status: SHIPPED | OUTPATIENT
Start: 2025-04-17

## 2025-04-17 NOTE — PROGRESS NOTES
Adult Annual Physical  Name: Joseph Huang      : 1965      MRN: [id number]  Encounter Provider: ALYSSA Perry  Encounter Date: 2025   Encounter department: [facility]    :  Assessment & Plan  Encounter to establish care         Annual physical exam         Primary hypertension  BP Readings from Last 3 Encounters:   25 118/70   21 138/90   21 146/90   - At goal with lifestyle management  - Reports compliance with medications.  - Continue eating a low salt diet and exercising         Encounter for screening colonoscopy    Orders:    Ambulatory Referral to Gastroenterology; Future    Screening for metabolic disorder    Orders:    Comprehensive metabolic panel; Future    Screening for deficiency anemia    Orders:    CBC and differential; Future    Screening for thyroid disorder    Orders:    TSH, 3rd generation with Free T4 reflex; Future    Screening for lipid disorders    Orders:    Lipid panel; Future    Screening for diabetes mellitus    Orders:    Hemoglobin A1C; Future    Encounter for immunization    Orders:    TDAP VACCINE GREATER THAN OR EQUAL TO 8YO IM    Zoster Vaccine Recombinant IM    Onychomycosis    Orders:    ciclopirox (PENLAC) 8 % solution; Apply topically daily at bedtime        Preventive Screenings:    - Hepatitis C screening: screening up-to-date   - HIV screening: screening up-to-date   - Lung cancer screening: screening not indicated     Immunizations:  - Immunizations due: Influenza    BMI Counseling: Body mass index is 26 kg/m². The BMI is above normal. Nutrition recommendations include decreasing portion sizes, encouraging healthy choices of fruits and vegetables, decreasing fast food intake, consuming healthier snacks, moderation in carbohydrate intake, increasing intake of lean protein and reducing intake of cholesterol. Exercise recommendations include exercising 3-5 times per week. No pharmacotherapy was ordered.  Patient referred to PCP. Rationale for BMI follow-up plan is due to patient being overweight or obese.     Depression Screening and Follow-up Plan: Patient was screened for depression during today's encounter. They screened negative with a PHQ-2 score of 0.          History of Present Illness     Adult Annual Physical:  Patient presents for annual physical. Joseph Huang is a 59 y.o. with  has a past medical history of Anxiety.     Patient is here to establish care     Patient  presents to the clinic for management of his chronic medical conditions.  Patient's medical conditions are stable unless noted otherwise above.  Patient has no further complaints other than what is mentioned in the ROS.  .     Diet and Physical Activity:  - Diet/Nutrition: well balanced diet, adequate fiber intake, adequate whole grain intake and consuming 3-5 servings of fruits/vegetables daily.  - Exercise: walking, 5-7 times a week on average and 30-60 minutes on average.    Depression Screening:  - PHQ-2 Score: 0    General Health:  - Sleep: sleeps well, 7-8 hours of sleep on average and snores loudly. states has apnea  - Hearing: normal hearing bilateral ears and tinnitus. states tinnitus started about 5-6 yrs ago and seen a specialist and nothing could be done  - Vision: vision problems, wears glasses and most recent eye exam < 1 year ago.     Health:  - History of STDs: no.   - Urinary symptoms: none.     Advanced Care Planning:  - Has an advanced directive?: no    - Has a durable medical POA?: no      Review of Systems   Constitutional: Negative.  Negative for chills and fever.   HENT: Negative.  Negative for ear pain and sore throat.    Eyes: Negative.  Negative for pain and visual disturbance.   Respiratory: Negative.  Negative for cough and shortness of breath.    Cardiovascular: Negative.  Negative for chest pain and palpitations.   Gastrointestinal: Negative.  Negative for abdominal pain and vomiting.   Genitourinary: Negative.  " Negative for dysuria and hematuria.   Musculoskeletal: Negative.  Negative for arthralgias and back pain.   Skin: Negative.  Negative for color change and rash.   Neurological: Negative.  Negative for seizures and syncope.   Psychiatric/Behavioral: Negative.  Negative for behavioral problems.    All other systems reviewed and are negative.        Objective   /70 (BP Location: Right arm, Patient Position: Sitting, Cuff Size: Large)   Pulse 87   Temp (!) 97.2 °F (36.2 °C) (Temporal)   Resp 16   Ht 5' 8\" (1.727 m)   Wt 77.6 kg (171 lb)   SpO2 100%   BMI 26.00 kg/m²     Physical Exam  Vitals and nursing note reviewed.   Constitutional:       General: He is not in acute distress.     Appearance: Normal appearance. He is well-developed.   HENT:      Head: Normocephalic and atraumatic.      Right Ear: Tympanic membrane normal.      Left Ear: Tympanic membrane normal.      Nose: Nose normal.      Mouth/Throat:      Mouth: Mucous membranes are moist.      Pharynx: Oropharynx is clear.   Eyes:      Conjunctiva/sclera: Conjunctivae normal.   Cardiovascular:      Rate and Rhythm: Normal rate and regular rhythm.      Pulses: Normal pulses.      Heart sounds: Normal heart sounds. No murmur heard.  Pulmonary:      Effort: Pulmonary effort is normal. No respiratory distress.      Breath sounds: Normal breath sounds.   Abdominal:      General: Bowel sounds are normal.      Palpations: Abdomen is soft.      Tenderness: There is no abdominal tenderness.   Musculoskeletal:         General: No swelling. Normal range of motion.      Cervical back: Normal range of motion and neck supple.   Feet:      Right foot:      Toenail Condition: Fungal disease present.     Left foot:      Toenail Condition: Fungal disease present.  Skin:     General: Skin is warm and dry.      Capillary Refill: Capillary refill takes less than 2 seconds.   Neurological:      General: No focal deficit present.      Mental Status: He is alert and " oriented to person, place, and time. Mental status is at baseline.   Psychiatric:         Mood and Affect: Mood normal.         Behavior: Behavior normal.         Thought Content: Thought content normal.         Judgment: Judgment normal.

## 2025-04-17 NOTE — ASSESSMENT & PLAN NOTE
BP Readings from Last 3 Encounters:   04/17/25 118/70   05/17/21 138/90   03/26/21 146/90   - At goal with lifestyle management  - Reports compliance with medications.  - Continue eating a low salt diet and exercising

## 2025-04-17 NOTE — PATIENT INSTRUCTIONS
"Patient Education     Routine physical for adults   The Basics   Written by the doctors and editors at [facility]   What is a physical? -- A physical is a routine visit, or \"check-up,\" with your doctor. You might also hear it called a \"wellness visit\" or \"preventive visit.\"  During each visit, the doctor will:   Ask about your physical and mental health   Ask about your habits, behaviors, and lifestyle   Do an exam   Give you vaccines if needed   Talk to you about any medicines you take   Give advice about your health   Answer your questions  Getting regular check-ups is an important part of taking care of your health. It can help your doctor find and treat any problems you have. But it's also important for preventing health problems.  A routine physical is different from a \"sick visit.\" A sick visit is when you see a doctor because of a health concern or problem. Since physicals are scheduled ahead of time, you can think about what you want to ask the doctor.  How often should I get a physical? -- It depends on your age and health. In general, for people age 21 years and older:   If you are younger than 50 years, you might be able to get a physical every 3 years.   If you are 50 years or older, your doctor might recommend a physical every year.  If you have an ongoing health condition, like diabetes or high blood pressure, your doctor will probably want to see you more often.  What happens during a physical? -- In general, each visit will include:   Physical exam - The doctor or nurse will check your height, weight, heart rate, and blood pressure. They will also look at your eyes and ears. They will ask about how you are feeling and whether you have any symptoms that bother you.   Medicines - It's a good idea to bring a list of all the medicines you take to each doctor visit. Your doctor will talk to you about your medicines and answer any questions. Tell them if you are having any side effects that bother you. You " "should also tell them if you are having trouble paying for any of your medicines.   Habits and behaviors - This includes:   Your diet   Your exercise habits   Whether you smoke, drink alcohol, or use drugs   Whether you are sexually active   Whether you feel safe at home  Your doctor will talk to you about things you can do to improve your health and lower your risk of health problems. They will also offer help and support. For example, if you want to quit smoking, they can give you advice and might prescribe medicines. If you want to improve your diet or get more physical activity, they can help you with this, too.   Lab tests, if needed - The tests you get will depend on your age and situation. For example, your doctor might want to check your:   Cholesterol   Blood sugar   Iron level   Vaccines - The recommended vaccines will depend on your age, health, and what vaccines you already had. Vaccines are very important because they can prevent certain serious or deadly infections.   Discussion of screening - \"Screening\" means checking for diseases or other health problems before they cause symptoms. Your doctor can recommend screening based on your age, risk, and preferences. This might include tests to check for:   Cancer, such as breast, prostate, cervical, ovarian, colorectal, prostate, lung, or skin cancer   Sexually transmitted infections, such as chlamydia and gonorrhea   Mental health conditions like depression and anxiety  Your doctor will talk to you about the different types of screening tests. They can help you decide which screenings to have. They can also explain what the results might mean.   Answering questions - The physical is a good time to ask the doctor or nurse questions about your health. If needed, they can refer you to other doctors or specialists, too.  Adults older than 65 years often need other care, too. As you get older, your doctor will talk to you about:   How to prevent falling at " home   Hearing or vision tests   Memory testing   How to take your medicines safely   Making sure that you have the help and support you need at home  All topics are updated as new evidence becomes available and our peer review process is complete.  This topic retrieved from [employer] on: May 02, 2024.  Topic 318303 Version 1.0  Release: 32.4.3 - C32.122  © 2024 UpToDate, Inc. and/or its affiliates. All rights reserved.  Consumer Information Use and Disclaimer   Disclaimer: This generalized information is a limited summary of diagnosis, treatment, and/or medication information. It is not meant to be comprehensive and should be used as a tool to help the user understand and/or assess potential diagnostic and treatment options. It does NOT include all information about conditions, treatments, medications, side effects, or risks that may apply to a specific patient. It is not intended to be medical advice or a substitute for the medical advice, diagnosis, or treatment of a health care provider based on the health care provider's examination and assessment of a patient's specific and unique circumstances. Patients must speak with a health care provider for complete information about their health, medical questions, and treatment options, including any risks or benefits regarding use of medications. This information does not endorse any treatments or medications as safe, effective, or approved for treating a specific patient. UpToDate, Inc. and its affiliates disclaim any warranty or liability relating to this information or the use thereof.The use of this information is governed by the Terms of Use, available at https://www.[employer].com/en/know/clinical-effectiveness-terms. 2024© UpToDate, Inc. and its affiliates and/or licensors. All rights reserved.  Copyright   © 2024 UpToDate, Inc. and/or its affiliates. All rights reserved.

## 2025-06-01 ENCOUNTER — OFFICE VISIT (OUTPATIENT)
Dept: URGENT CARE | Age: 60
End: 2025-06-01
Payer: COMMERCIAL

## 2025-06-01 VITALS
DIASTOLIC BLOOD PRESSURE: 88 MMHG | BODY MASS INDEX: 26.19 KG/M2 | RESPIRATION RATE: 20 BRPM | OXYGEN SATURATION: 98 % | WEIGHT: 172.8 LBS | HEIGHT: 68 IN | SYSTOLIC BLOOD PRESSURE: 132 MMHG | HEART RATE: 70 BPM | TEMPERATURE: 97.3 F

## 2025-06-01 DIAGNOSIS — H10.9 BACTERIAL CONJUNCTIVITIS OF RIGHT EYE: Primary | ICD-10-CM

## 2025-06-01 PROCEDURE — 99213 OFFICE O/P EST LOW 20 MIN: CPT | Performed by: STUDENT IN AN ORGANIZED HEALTH CARE EDUCATION/TRAINING PROGRAM

## 2025-06-01 PROCEDURE — S9088 SERVICES PROVIDED IN URGENT: HCPCS | Performed by: STUDENT IN AN ORGANIZED HEALTH CARE EDUCATION/TRAINING PROGRAM

## 2025-06-01 RX ORDER — OFLOXACIN 3 MG/ML
1 SOLUTION/ DROPS OPHTHALMIC 4 TIMES DAILY
Qty: 5 ML | Refills: 0 | Status: SHIPPED | OUTPATIENT
Start: 2025-06-01 | End: 2025-06-08

## 2025-06-01 NOTE — PATIENT INSTRUCTIONS
Use eye drops as prescribed  Go to ED if you experience vision loss/impairment, inability to open the eye redness or swelling around the eye  Fu with ophthalmology if not improving

## 2025-06-01 NOTE — PROGRESS NOTES
[facility]        NAME: Joseph Huang is a 59 y.o. male  : 1965    MRN: [id number]  DATE: 2025  TIME: 10:13 AM    Assessment and Plan   Bacterial conjunctivitis of right eye [H10.9]  1. Bacterial conjunctivitis of right eye  ofloxacin (OCUFLOX) 0.3 % ophthalmic solution    Ambulatory Referral to Ophthalmology        Use eye drops as prescribed  Go to ED if you experience vision loss/impairment, inability to open the eye redness or swelling around the eye    Patient Instructions       Follow up with PCP in 3-5 days.  Proceed to  ER if symptoms worsen.    If tests have been performed at [facility], our office will contact you with results if changes need to be made to the care plan discussed with you at the visit.  You can review your full results on [facility].    Chief Complaint     Chief Complaint   Patient presents with    Eye Problem     Having eye irritation to right eye for the past couple weeks. Had a prior injury to right eye where a branch hit him in the eye. Yesterday, it started to bother him. Eye is red and swollen. Having drainage and sensitivity to light.          History of Present Illness       Conjunctivitis   The current episode started yesterday. The problem has been unchanged. The problem is moderate. Nothing relieves the symptoms. Nothing aggravates the symptoms. Associated symptoms include eye itching, photophobia, eye discharge and eye redness. Pertinent negatives include no decreased vision, no double vision, no congestion, no ear discharge, no ear pain, no sore throat, no stridor, no swollen glands and no eye pain.       Review of Systems   Review of Systems   HENT:  Negative for congestion, ear discharge, ear pain and sore throat.    Eyes:  Positive for photophobia, discharge, redness and itching. Negative for double vision and pain.   Respiratory:  Negative for stridor.        As stated in HPI      Current Medications     Current Medications[1]    Current  "Allergies     Allergies as of 06/01/2025    (No Known Allergies)            The following portions of the patient's history were reviewed and updated as appropriate: allergies, current medications, past family history, past medical history, past social history, past surgical history and problem list.     Past Medical History[2]    Past Surgical History[3]    Family History[4]      Medications have been verified.        Objective   /88 (BP Location: Right arm)   Pulse 70   Temp (!) 97.3 °F (36.3 °C) (Tympanic)   Resp 20   Ht 5' 8\" (1.727 m)   Wt 78.4 kg (172 lb 12.8 oz)   SpO2 98%   BMI 26.27 kg/m²   No LMP for male patient.       Physical Exam     Physical Exam  Constitutional:       General: He is not in acute distress.     Appearance: He is well-developed. He is not toxic-appearing.   HENT:      Head: Normocephalic and atraumatic.      Right Ear: External ear normal.      Left Ear: External ear normal.     Eyes:      General: Lids are normal. Lids are everted, no foreign bodies appreciated. Vision grossly intact.         Right eye: No foreign body.      Extraocular Movements: Extraocular movements intact.      Conjunctiva/sclera:      Right eye: Right conjunctiva is injected. Exudate present.      Pupils: Pupils are equal, round, and reactive to light. Pupils are equal.      Right eye: No corneal abrasion or fluorescein uptake.       Cardiovascular:      Rate and Rhythm: Normal rate.   Pulmonary:      Effort: Pulmonary effort is normal.     Neurological:      General: No focal deficit present.      Mental Status: He is alert and oriented to person, place, and time.     Psychiatric:         Mood and Affect: Mood normal.                        [1]   Current Outpatient Medications:     ofloxacin (OCUFLOX) 0.3 % ophthalmic solution, Administer 1 drop to the right eye 4 (four) times a day for 7 days, Disp: 5 mL, Rfl: 0    ciclopirox (PENLAC) 8 % solution, Apply topically daily at bedtime (Patient not " taking: Reported on 6/1/2025), Disp: 6 mL, Rfl: 1  [2]   Past Medical History:  Diagnosis Date    Anxiety    [3]   Past Surgical History:  Procedure Laterality Date    KNEE SURGERY Left     a couple years ago in [place], [place]   [4]   Family History  Problem Relation Name Age of Onset    Hypertension Mother      Cancer Mother      Ovarian cancer Mother      Hypertension Father      Stroke Father